# Patient Record
Sex: FEMALE | Race: WHITE | NOT HISPANIC OR LATINO
[De-identification: names, ages, dates, MRNs, and addresses within clinical notes are randomized per-mention and may not be internally consistent; named-entity substitution may affect disease eponyms.]

---

## 2017-01-31 ENCOUNTER — APPOINTMENT (OUTPATIENT)
Dept: CARDIOLOGY | Facility: CLINIC | Age: 82
End: 2017-01-31

## 2017-02-08 ENCOUNTER — APPOINTMENT (OUTPATIENT)
Dept: CARDIOLOGY | Facility: CLINIC | Age: 82
End: 2017-02-08

## 2017-05-08 ENCOUNTER — APPOINTMENT (OUTPATIENT)
Dept: INTERNAL MEDICINE | Facility: CLINIC | Age: 82
End: 2017-05-08

## 2017-05-08 VITALS
HEIGHT: 63 IN | BODY MASS INDEX: 25.69 KG/M2 | WEIGHT: 145 LBS | SYSTOLIC BLOOD PRESSURE: 100 MMHG | HEART RATE: 52 BPM | OXYGEN SATURATION: 95 % | TEMPERATURE: 98.2 F | DIASTOLIC BLOOD PRESSURE: 54 MMHG

## 2017-05-08 VITALS — DIASTOLIC BLOOD PRESSURE: 54 MMHG | SYSTOLIC BLOOD PRESSURE: 132 MMHG

## 2017-05-08 DIAGNOSIS — R42 DIZZINESS AND GIDDINESS: ICD-10-CM

## 2017-05-23 ENCOUNTER — APPOINTMENT (OUTPATIENT)
Dept: VASCULAR SURGERY | Facility: CLINIC | Age: 82
End: 2017-05-23

## 2017-05-23 VITALS
WEIGHT: 143 LBS | HEIGHT: 63 IN | DIASTOLIC BLOOD PRESSURE: 79 MMHG | BODY MASS INDEX: 25.34 KG/M2 | SYSTOLIC BLOOD PRESSURE: 147 MMHG | TEMPERATURE: 98.1 F | HEART RATE: 61 BPM

## 2017-05-23 DIAGNOSIS — Z82.49 FAMILY HISTORY OF ISCHEMIC HEART DISEASE AND OTHER DISEASES OF THE CIRCULATORY SYSTEM: ICD-10-CM

## 2017-05-23 DIAGNOSIS — Z96.649 PRESENCE OF UNSPECIFIED ARTIFICIAL HIP JOINT: ICD-10-CM

## 2017-05-23 DIAGNOSIS — Z87.39 PERSONAL HISTORY OF OTHER DISEASES OF THE MUSCULOSKELETAL SYSTEM AND CONNECTIVE TISSUE: ICD-10-CM

## 2017-06-02 ENCOUNTER — APPOINTMENT (OUTPATIENT)
Dept: VASCULAR SURGERY | Facility: CLINIC | Age: 82
End: 2017-06-02

## 2017-06-05 ENCOUNTER — APPOINTMENT (OUTPATIENT)
Dept: VASCULAR SURGERY | Facility: CLINIC | Age: 82
End: 2017-06-05

## 2017-06-08 ENCOUNTER — APPOINTMENT (OUTPATIENT)
Dept: INTERNAL MEDICINE | Facility: CLINIC | Age: 82
End: 2017-06-08

## 2017-07-19 ENCOUNTER — NON-APPOINTMENT (OUTPATIENT)
Age: 82
End: 2017-07-19

## 2017-07-19 ENCOUNTER — APPOINTMENT (OUTPATIENT)
Dept: INTERNAL MEDICINE | Facility: CLINIC | Age: 82
End: 2017-07-19

## 2017-07-19 ENCOUNTER — LABORATORY RESULT (OUTPATIENT)
Age: 82
End: 2017-07-19

## 2017-07-19 VITALS
DIASTOLIC BLOOD PRESSURE: 60 MMHG | BODY MASS INDEX: 26.13 KG/M2 | WEIGHT: 142 LBS | OXYGEN SATURATION: 100 % | TEMPERATURE: 98 F | HEIGHT: 62 IN | HEART RATE: 58 BPM | SYSTOLIC BLOOD PRESSURE: 110 MMHG

## 2017-08-04 ENCOUNTER — APPOINTMENT (OUTPATIENT)
Dept: VASCULAR SURGERY | Facility: CLINIC | Age: 82
End: 2017-08-04

## 2017-08-07 ENCOUNTER — APPOINTMENT (OUTPATIENT)
Dept: VASCULAR SURGERY | Facility: CLINIC | Age: 82
End: 2017-08-07

## 2017-08-28 LAB
ALBUMIN SERPL ELPH-MCNC: 4.2 G/DL
ALP BLD-CCNC: 77 U/L
ALT SERPL-CCNC: 12 U/L
ANION GAP SERPL CALC-SCNC: 16 MMOL/L
APPEARANCE: CLEAR
AST SERPL-CCNC: 22 U/L
BASOPHILS # BLD AUTO: 0.07 K/UL
BASOPHILS NFR BLD AUTO: 1.7 %
BILIRUB SERPL-MCNC: 0.8 MG/DL
BILIRUBIN URINE: NEGATIVE
BLOOD URINE: ABNORMAL
BUN SERPL-MCNC: 20 MG/DL
CALCIUM SERPL-MCNC: 9.3 MG/DL
CHLORIDE SERPL-SCNC: 107 MMOL/L
CHOLEST SERPL-MCNC: 174 MG/DL
CHOLEST/HDLC SERPL: 2.1 RATIO
CO2 SERPL-SCNC: 21 MMOL/L
COLOR: YELLOW
CREAT SERPL-MCNC: 0.82 MG/DL
EOSINOPHIL # BLD AUTO: 0.07 K/UL
EOSINOPHIL NFR BLD AUTO: 1.7 %
FERRITIN SERPL-MCNC: 162 NG/ML
GLUCOSE QUALITATIVE U: NORMAL MG/DL
GLUCOSE SERPL-MCNC: 94 MG/DL
HBA1C MFR BLD HPLC: 5.3 %
HCT VFR BLD CALC: 30.3 %
HDLC SERPL-MCNC: 82 MG/DL
HGB BLD-MCNC: 9.9 G/DL
IMM GRANULOCYTES NFR BLD AUTO: 0.2 %
IRON SATN MFR SERPL: 55 %
IRON SERPL-MCNC: 187 UG/DL
KETONES URINE: NEGATIVE
LDLC SERPL CALC-MCNC: 79 MG/DL
LEUKOCYTE ESTERASE URINE: ABNORMAL
LYMPHOCYTES # BLD AUTO: 2.11 K/UL
LYMPHOCYTES NFR BLD AUTO: 49.8 %
MAN DIFF?: NORMAL
MCHC RBC-ENTMCNC: 30.6 PG
MCHC RBC-ENTMCNC: 32.7 GM/DL
MCV RBC AUTO: 93.5 FL
MONOCYTES # BLD AUTO: 0.35 K/UL
MONOCYTES NFR BLD AUTO: 8.3 %
NEUTROPHILS # BLD AUTO: 1.63 K/UL
NEUTROPHILS NFR BLD AUTO: 38.3 %
NITRITE URINE: POSITIVE
PH URINE: 5.5
PLATELET # BLD AUTO: 246 K/UL
POTASSIUM SERPL-SCNC: 4.3 MMOL/L
PROT SERPL-MCNC: 6.9 G/DL
PROTEIN URINE: NEGATIVE MG/DL
RBC # BLD: 3.24 M/UL
RBC # FLD: 18.6 %
SODIUM SERPL-SCNC: 144 MMOL/L
SPECIFIC GRAVITY URINE: 1.03
T4 FREE SERPL-MCNC: 0.9 NG/DL
TIBC SERPL-MCNC: 342 UG/DL
TRIGL SERPL-MCNC: 67 MG/DL
TSH SERPL-ACNC: 2.11 UIU/ML
UIBC SERPL-MCNC: 155 UG/DL
UROBILINOGEN URINE: NORMAL MG/DL
WBC # FLD AUTO: 4.24 K/UL

## 2017-09-15 ENCOUNTER — APPOINTMENT (OUTPATIENT)
Dept: INTERNAL MEDICINE | Facility: CLINIC | Age: 82
End: 2017-09-15
Payer: COMMERCIAL

## 2017-09-15 VITALS
DIASTOLIC BLOOD PRESSURE: 72 MMHG | OXYGEN SATURATION: 97 % | HEIGHT: 62 IN | SYSTOLIC BLOOD PRESSURE: 110 MMHG | TEMPERATURE: 98.1 F | HEART RATE: 63 BPM
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
## 2017-09-15 PROCEDURE — 99214 OFFICE O/P EST MOD 30 MIN: CPT

## 2017-10-06 ENCOUNTER — OUTPATIENT (OUTPATIENT)
Dept: OUTPATIENT SERVICES | Facility: HOSPITAL | Age: 82
LOS: 1 days | End: 2017-10-06
Payer: MEDICARE

## 2017-10-06 ENCOUNTER — APPOINTMENT (OUTPATIENT)
Dept: MAMMOGRAPHY | Facility: IMAGING CENTER | Age: 82
End: 2017-10-06
Payer: MEDICARE

## 2017-10-06 DIAGNOSIS — Z90.710 ACQUIRED ABSENCE OF BOTH CERVIX AND UTERUS: Chronic | ICD-10-CM

## 2017-10-06 DIAGNOSIS — Z98.89 OTHER SPECIFIED POSTPROCEDURAL STATES: Chronic | ICD-10-CM

## 2017-10-06 DIAGNOSIS — Z96.649 PRESENCE OF UNSPECIFIED ARTIFICIAL HIP JOINT: Chronic | ICD-10-CM

## 2017-10-06 DIAGNOSIS — Z00.8 ENCOUNTER FOR OTHER GENERAL EXAMINATION: ICD-10-CM

## 2017-10-06 PROCEDURE — 77063 BREAST TOMOSYNTHESIS BI: CPT

## 2017-10-06 PROCEDURE — 77067 SCR MAMMO BI INCL CAD: CPT

## 2017-10-06 PROCEDURE — G0202: CPT | Mod: 26

## 2017-10-06 PROCEDURE — 77063 BREAST TOMOSYNTHESIS BI: CPT | Mod: 26

## 2017-11-06 ENCOUNTER — MEDICATION RENEWAL (OUTPATIENT)
Age: 82
End: 2017-11-06

## 2018-04-23 ENCOUNTER — APPOINTMENT (OUTPATIENT)
Dept: INTERNAL MEDICINE | Facility: CLINIC | Age: 83
End: 2018-04-23
Payer: MEDICARE

## 2018-04-23 VITALS
DIASTOLIC BLOOD PRESSURE: 70 MMHG | OXYGEN SATURATION: 98 % | WEIGHT: 147 LBS | BODY MASS INDEX: 27.05 KG/M2 | HEIGHT: 62 IN | HEART RATE: 59 BPM | SYSTOLIC BLOOD PRESSURE: 140 MMHG

## 2018-04-23 DIAGNOSIS — N39.0 URINARY TRACT INFECTION, SITE NOT SPECIFIED: ICD-10-CM

## 2018-04-23 DIAGNOSIS — Z87.09 PERSONAL HISTORY OF OTHER DISEASES OF THE RESPIRATORY SYSTEM: ICD-10-CM

## 2018-04-23 DIAGNOSIS — Z87.820 PERSONAL HISTORY OF TRAUMATIC BRAIN INJURY: ICD-10-CM

## 2018-04-23 DIAGNOSIS — B37.3 CANDIDIASIS OF VULVA AND VAGINA: ICD-10-CM

## 2018-04-23 PROCEDURE — 99213 OFFICE O/P EST LOW 20 MIN: CPT

## 2018-04-26 ENCOUNTER — APPOINTMENT (OUTPATIENT)
Dept: CARDIOLOGY | Facility: CLINIC | Age: 83
End: 2018-04-26
Payer: MEDICARE

## 2018-04-26 ENCOUNTER — NON-APPOINTMENT (OUTPATIENT)
Age: 83
End: 2018-04-26

## 2018-04-26 VITALS
BODY MASS INDEX: 27.23 KG/M2 | OXYGEN SATURATION: 94 % | TEMPERATURE: 99.2 F | WEIGHT: 148 LBS | SYSTOLIC BLOOD PRESSURE: 153 MMHG | HEART RATE: 70 BPM | HEIGHT: 62 IN | DIASTOLIC BLOOD PRESSURE: 65 MMHG

## 2018-04-26 VITALS — DIASTOLIC BLOOD PRESSURE: 70 MMHG | SYSTOLIC BLOOD PRESSURE: 130 MMHG

## 2018-04-26 PROCEDURE — 99215 OFFICE O/P EST HI 40 MIN: CPT

## 2018-04-26 PROCEDURE — 93000 ELECTROCARDIOGRAM COMPLETE: CPT

## 2018-05-07 ENCOUNTER — APPOINTMENT (OUTPATIENT)
Dept: CARDIOLOGY | Facility: CLINIC | Age: 83
End: 2018-05-07

## 2018-05-29 ENCOUNTER — APPOINTMENT (OUTPATIENT)
Dept: CARDIOLOGY | Facility: CLINIC | Age: 83
End: 2018-05-29
Payer: MEDICARE

## 2018-05-29 PROCEDURE — 78452 HT MUSCLE IMAGE SPECT MULT: CPT

## 2018-05-29 PROCEDURE — A9500: CPT

## 2018-05-29 PROCEDURE — 93015 CV STRESS TEST SUPVJ I&R: CPT

## 2018-07-16 ENCOUNTER — LABORATORY RESULT (OUTPATIENT)
Age: 83
End: 2018-07-16

## 2018-07-16 ENCOUNTER — APPOINTMENT (OUTPATIENT)
Dept: INTERNAL MEDICINE | Facility: CLINIC | Age: 83
End: 2018-07-16
Payer: MEDICARE

## 2018-07-16 VITALS
DIASTOLIC BLOOD PRESSURE: 60 MMHG | HEIGHT: 62 IN | BODY MASS INDEX: 26.31 KG/M2 | SYSTOLIC BLOOD PRESSURE: 118 MMHG | HEART RATE: 60 BPM | TEMPERATURE: 98.3 F | WEIGHT: 143 LBS | OXYGEN SATURATION: 96 %

## 2018-07-16 DIAGNOSIS — N39.41 URGE INCONTINENCE: ICD-10-CM

## 2018-07-16 DIAGNOSIS — I83.90 ASYMPTOMATIC VARICOSE VEINS OF UNSPECIFIED LOWER EXTREMITY: ICD-10-CM

## 2018-07-16 PROCEDURE — G0439: CPT

## 2018-07-16 PROCEDURE — G0444 DEPRESSION SCREEN ANNUAL: CPT | Mod: 59

## 2018-07-16 PROCEDURE — 36415 COLL VENOUS BLD VENIPUNCTURE: CPT

## 2018-07-16 RX ORDER — MIRABEGRON 25 MG/1
25 TABLET, FILM COATED, EXTENDED RELEASE ORAL
Qty: 30 | Refills: 0 | Status: DISCONTINUED | COMMUNITY
Start: 2017-12-19 | End: 2018-07-16

## 2018-07-16 RX ORDER — NAPROXEN 500 MG/1
500 TABLET, DELAYED RELEASE ORAL TWICE DAILY
Qty: 20 | Refills: 1 | Status: DISCONTINUED | COMMUNITY
Start: 2018-04-23 | End: 2018-07-16

## 2018-07-17 NOTE — PHYSICAL EXAM
[No Acute Distress] : no acute distress [Well Nourished] : well nourished [Well Developed] : well developed [Well-Appearing] : well-appearing [Normal Voice/Communication] : normal voice/communication [Normal Sclera/Conjunctiva] : normal sclera/conjunctiva [PERRL] : pupils equal round and reactive to light [EOMI] : extraocular movements intact [Normal Outer Ear/Nose] : the outer ears and nose were normal in appearance [Normal Oropharynx] : the oropharynx was normal [Normal TMs] : both tympanic membranes were normal [No JVD] : no jugular venous distention [Supple] : supple [No Lymphadenopathy] : no lymphadenopathy [Thyroid Normal, No Nodules] : the thyroid was normal and there were no nodules present [No Respiratory Distress] : no respiratory distress  [Clear to Auscultation] : lungs were clear to auscultation bilaterally [No Accessory Muscle Use] : no accessory muscle use [Normal Rate] : normal rate  [Regular Rhythm] : with a regular rhythm [Normal S1, S2] : normal S1 and S2 [No Murmur] : no murmur heard [No Carotid Bruits] : no carotid bruits [No Abdominal Bruit] : a ~M bruit was not heard ~T in the abdomen [No Varicosities] : no varicosities [Pedal Pulses Present] : the pedal pulses are present [No Edema] : there was no peripheral edema [No Extremity Clubbing/Cyanosis] : no extremity clubbing/cyanosis [Declined Breast Exam] : declined breast exam  [Soft] : abdomen soft [Non Tender] : non-tender [Non-distended] : non-distended [No Masses] : no abdominal mass palpated [No HSM] : no HSM [Normal Bowel Sounds] : normal bowel sounds [Normal Supraclavicular Nodes] : no supraclavicular lymphadenopathy [Normal Posterior Cervical Nodes] : no posterior cervical lymphadenopathy [Normal Anterior Cervical Nodes] : no anterior cervical lymphadenopathy [No Spinal Tenderness] : no spinal tenderness [No Joint Swelling] : no joint swelling [Grossly Normal Strength/Tone] : grossly normal strength/tone [No Rash] : no rash [Normal Gait] : normal gait [Coordination Grossly Intact] : coordination grossly intact [No Focal Deficits] : no focal deficits [Speech Grossly Normal] : speech grossly normal [Normal Affect] : the affect was normal [Normal Mood] : the mood was normal [Normal Insight/Judgement] : insight and judgment were intact

## 2018-07-17 NOTE — ASSESSMENT
[FreeTextEntry1] : discussed w pt \par \par check routine labs as below \par \par cont current rx \par \par cont current activities mild exercise \par \par discussed vaccinations, she would like to consider new Shingrix vaccine when supply available \par \par cont urology f/u w Dr Zaragoza, excellent results after Botox inj \par \par reviewed cardio workup for prior atypical chest pains w negative stress testing and w persistent mild exertional dyspnea when ascending stairs. will advised pulmonary consult and PFT evaluation, gave info for referral and she will make appt \par \par RTO few months for f/u or earlier prn if any new concerns \par

## 2018-07-17 NOTE — REVIEW OF SYSTEMS
[Chest Pain] : no chest pain [Leg Claudication] : no leg claudication [Lower Ext Edema] : no lower extremity edema [Orthopnea] : no orthopnea [Paroysmal Nocturnal Dyspnea] : no paroysmal nocturnal dyspnea [Wheezing] : no wheezing [Cough] : no cough [Dyspnea on Exertion] : dyspnea on exertion [Dysuria] : no dysuria [Hematuria] : no hematuria [Negative] : Heme/Lymph

## 2018-07-17 NOTE — HISTORY OF PRESENT ILLNESS
[de-identified] : presents for annual physical exam. she feels well overall currently. but she has noted persistent mild dyspnea on exertion, mainly walking up stairs over pst few months. she had further eval w Dr Hoffman cardiology , had neg stress echo and neg nuclear stress testing over past 1-2 years. MVP has remained stable. she is a nonsmoker and has no chronic cough. \par \par labile HTN well controlled on rx \par chronic mild anemia , unclear etiology asymptomatic , FOBT neg and she had colonoscopy completed w Dr Wilkins this past year w benign polyp \par urinary urge incontinence much improved after Botox treatment w Dr Zaragoza urology - pt is very pleased w excellent results

## 2018-07-17 NOTE — HEALTH RISK ASSESSMENT
[] : No [No falls in past year] : Patient reported no falls in the past year [Patient reported mammogram was normal] : Patient reported mammogram was normal [Patient declined bone density test] : Patient declined bone density test [Patient reported colonoscopy was normal] : Patient reported colonoscopy was normal [None] : None [MammogramDate] : 10/17 [ColonoscopyDate] : 03/18

## 2018-07-26 ENCOUNTER — APPOINTMENT (OUTPATIENT)
Dept: PULMONOLOGY | Facility: CLINIC | Age: 83
End: 2018-07-26
Payer: MEDICARE

## 2018-07-26 VITALS
WEIGHT: 143 LBS | HEART RATE: 65 BPM | SYSTOLIC BLOOD PRESSURE: 128 MMHG | RESPIRATION RATE: 17 BRPM | OXYGEN SATURATION: 97 % | BODY MASS INDEX: 26.31 KG/M2 | DIASTOLIC BLOOD PRESSURE: 62 MMHG | HEIGHT: 62 IN

## 2018-07-26 DIAGNOSIS — R06.02 SHORTNESS OF BREATH: ICD-10-CM

## 2018-07-26 PROCEDURE — 94727 GAS DIL/WSHOT DETER LNG VOL: CPT

## 2018-07-26 PROCEDURE — 94729 DIFFUSING CAPACITY: CPT

## 2018-07-26 PROCEDURE — 99204 OFFICE O/P NEW MOD 45 MIN: CPT | Mod: 25

## 2018-07-26 PROCEDURE — 94060 EVALUATION OF WHEEZING: CPT

## 2018-08-06 LAB
ALBUMIN SERPL ELPH-MCNC: 4.5 G/DL
ALP BLD-CCNC: 80 U/L
ALT SERPL-CCNC: 18 U/L
ANION GAP SERPL CALC-SCNC: 18 MMOL/L
APPEARANCE: CLEAR
AST SERPL-CCNC: 22 U/L
BASOPHILS # BLD AUTO: 0.07 K/UL
BASOPHILS NFR BLD AUTO: 1.6 %
BILIRUB SERPL-MCNC: 0.7 MG/DL
BILIRUBIN URINE: NEGATIVE
BLOOD URINE: NEGATIVE
BUN SERPL-MCNC: 14 MG/DL
CALCIUM SERPL-MCNC: 8.8 MG/DL
CHLORIDE SERPL-SCNC: 106 MMOL/L
CHOLEST SERPL-MCNC: 159 MG/DL
CHOLEST/HDLC SERPL: 2.1 RATIO
CO2 SERPL-SCNC: 19 MMOL/L
COLOR: YELLOW
CREAT SERPL-MCNC: 0.63 MG/DL
EOSINOPHIL # BLD AUTO: 0.06 K/UL
EOSINOPHIL NFR BLD AUTO: 1.4 %
GLUCOSE QUALITATIVE U: NEGATIVE MG/DL
GLUCOSE SERPL-MCNC: 95 MG/DL
HBA1C MFR BLD HPLC: 5.7 %
HCT VFR BLD CALC: 30.2 %
HDLC SERPL-MCNC: 75 MG/DL
HGB BLD-MCNC: 10.2 G/DL
IMM GRANULOCYTES NFR BLD AUTO: 0 %
KETONES URINE: NEGATIVE
LDLC SERPL CALC-MCNC: 66 MG/DL
LEUKOCYTE ESTERASE URINE: ABNORMAL
LYMPHOCYTES # BLD AUTO: 2.2 K/UL
LYMPHOCYTES NFR BLD AUTO: 51.2 %
MAN DIFF?: NORMAL
MCHC RBC-ENTMCNC: 31.8 PG
MCHC RBC-ENTMCNC: 33.8 GM/DL
MCV RBC AUTO: 94.1 FL
MONOCYTES # BLD AUTO: 0.22 K/UL
MONOCYTES NFR BLD AUTO: 5.1 %
NEUTROPHILS # BLD AUTO: 1.75 K/UL
NEUTROPHILS NFR BLD AUTO: 40.7 %
NITRITE URINE: NEGATIVE
PH URINE: 5.5
PLATELET # BLD AUTO: 212 K/UL
POTASSIUM SERPL-SCNC: 4.1 MMOL/L
PROT SERPL-MCNC: 6.3 G/DL
PROTEIN URINE: ABNORMAL MG/DL
RBC # BLD: 3.21 M/UL
RBC # FLD: 19.4 %
SODIUM SERPL-SCNC: 143 MMOL/L
SPECIFIC GRAVITY URINE: 1.02
T4 FREE SERPL-MCNC: 0.9 NG/DL
TRIGL SERPL-MCNC: 92 MG/DL
TSH SERPL-ACNC: 2.11 UIU/ML
UROBILINOGEN URINE: NEGATIVE MG/DL
WBC # FLD AUTO: 4.3 K/UL

## 2018-08-09 ENCOUNTER — OUTPATIENT (OUTPATIENT)
Dept: OUTPATIENT SERVICES | Facility: HOSPITAL | Age: 83
LOS: 1 days | End: 2018-08-09
Payer: MEDICARE

## 2018-08-09 ENCOUNTER — APPOINTMENT (OUTPATIENT)
Dept: ULTRASOUND IMAGING | Facility: IMAGING CENTER | Age: 83
End: 2018-08-09
Payer: MEDICARE

## 2018-08-09 DIAGNOSIS — Z90.710 ACQUIRED ABSENCE OF BOTH CERVIX AND UTERUS: Chronic | ICD-10-CM

## 2018-08-09 DIAGNOSIS — Z96.649 PRESENCE OF UNSPECIFIED ARTIFICIAL HIP JOINT: Chronic | ICD-10-CM

## 2018-08-09 DIAGNOSIS — Z00.8 ENCOUNTER FOR OTHER GENERAL EXAMINATION: ICD-10-CM

## 2018-08-09 DIAGNOSIS — Z98.89 OTHER SPECIFIED POSTPROCEDURAL STATES: Chronic | ICD-10-CM

## 2018-08-09 PROCEDURE — 76700 US EXAM ABDOM COMPLETE: CPT

## 2018-08-09 PROCEDURE — 76700 US EXAM ABDOM COMPLETE: CPT | Mod: 26

## 2018-10-10 ENCOUNTER — LABORATORY RESULT (OUTPATIENT)
Age: 83
End: 2018-10-10

## 2018-10-10 ENCOUNTER — APPOINTMENT (OUTPATIENT)
Dept: INTERNAL MEDICINE | Facility: CLINIC | Age: 83
End: 2018-10-10
Payer: MEDICARE

## 2018-10-10 VITALS
SYSTOLIC BLOOD PRESSURE: 110 MMHG | OXYGEN SATURATION: 98 % | TEMPERATURE: 97.8 F | WEIGHT: 145 LBS | DIASTOLIC BLOOD PRESSURE: 60 MMHG | HEART RATE: 60 BPM | HEIGHT: 62 IN | BODY MASS INDEX: 26.68 KG/M2

## 2018-10-10 DIAGNOSIS — Z23 ENCOUNTER FOR IMMUNIZATION: ICD-10-CM

## 2018-10-10 DIAGNOSIS — D64.9 ANEMIA, UNSPECIFIED: ICD-10-CM

## 2018-10-10 DIAGNOSIS — D72.9 DISORDER OF WHITE BLOOD CELLS, UNSPECIFIED: ICD-10-CM

## 2018-10-10 PROCEDURE — 90662 IIV NO PRSV INCREASED AG IM: CPT

## 2018-10-10 PROCEDURE — 99214 OFFICE O/P EST MOD 30 MIN: CPT | Mod: 25

## 2018-10-10 PROCEDURE — G0008: CPT

## 2018-10-10 PROCEDURE — 36415 COLL VENOUS BLD VENIPUNCTURE: CPT

## 2018-10-10 NOTE — PHYSICAL EXAM
[No Acute Distress] : no acute distress [Well-Appearing] : well-appearing [Normal Voice/Communication] : normal voice/communication [Normal Sclera/Conjunctiva] : normal sclera/conjunctiva [No JVD] : no jugular venous distention [No Respiratory Distress] : no respiratory distress  [Clear to Auscultation] : lungs were clear to auscultation bilaterally [No Accessory Muscle Use] : no accessory muscle use [Normal Rate] : normal rate  [Regular Rhythm] : with a regular rhythm [Normal S1, S2] : normal S1 and S2 [No Edema] : there was no peripheral edema [Normal Supraclavicular Nodes] : no supraclavicular lymphadenopathy [Normal Posterior Cervical Nodes] : no posterior cervical lymphadenopathy [Normal Anterior Cervical Nodes] : no anterior cervical lymphadenopathy [Grossly Normal Strength/Tone] : grossly normal strength/tone [Normal Gait] : normal gait [Normal Affect] : the affect was normal [Normal Mood] : the mood was normal [Normal Insight/Judgement] : insight and judgment were intact

## 2018-10-10 NOTE — HISTORY OF PRESENT ILLNESS
[de-identified] : presents for f/u visit to review recent persistent feelings of fatigue. she is taking some naps during the day which she did not do in the past. she has moderate anemia which is persistent recently Hgb10 range. she saw her GI Dr Wilkins and had EGD and colonoscopy done 2 months ago w no significant findings. no bleeding concerns. she has followed w hematologist Dr Pineda in the past and she plans to make appt for reevaluation.

## 2018-10-10 NOTE — ASSESSMENT
[FreeTextEntry1] : reviewed w pt \par most recent labs reviewed \par EGD , colonoscopy done recently \par moderate anemia persists. noted some lymphocytosis, normal WBC. unclear if she may have a lymphatic process such as low grade CLL which will need to be evaluated further. \par repeat labs today as below including flow cytometry \par she will be making appt w Dr Pineda to review these labs \par \par f/u in 2-3 weeks or call earlier prn

## 2018-10-16 LAB
ALBUMIN SERPL ELPH-MCNC: 4.5 G/DL
ALP BLD-CCNC: 85 U/L
ALT SERPL-CCNC: 16 U/L
ANION GAP SERPL CALC-SCNC: 14 MMOL/L
AST SERPL-CCNC: 27 U/L
BASOPHILS # BLD AUTO: 0.11 K/UL
BASOPHILS NFR BLD AUTO: 2.1 %
BILIRUB SERPL-MCNC: 0.5 MG/DL
BUN SERPL-MCNC: 13 MG/DL
CALCIUM SERPL-MCNC: 9.2 MG/DL
CHLORIDE SERPL-SCNC: 106 MMOL/L
CO2 SERPL-SCNC: 23 MMOL/L
CREAT SERPL-MCNC: 0.61 MG/DL
EOSINOPHIL # BLD AUTO: 0.05 K/UL
EOSINOPHIL NFR BLD AUTO: 1 %
FERRITIN SERPL-MCNC: 195 NG/ML
GLUCOSE SERPL-MCNC: 81 MG/DL
HCT VFR BLD CALC: 32.1 %
HGB BLD-MCNC: 9.8 G/DL
IMM GRANULOCYTES NFR BLD AUTO: 0.2 %
IRON SATN MFR SERPL: 27 %
IRON SERPL-MCNC: 117 UG/DL
LYMPHOCYTES # BLD AUTO: 2.63 K/UL
LYMPHOCYTES NFR BLD AUTO: 50 %
MAN DIFF?: NORMAL
MCHC RBC-ENTMCNC: 30.2 PG
MCHC RBC-ENTMCNC: 30.5 GM/DL
MCV RBC AUTO: 99.1 FL
MONOCYTES # BLD AUTO: 0.28 K/UL
MONOCYTES NFR BLD AUTO: 5.3 %
NEUTROPHILS # BLD AUTO: 2.18 K/UL
NEUTROPHILS NFR BLD AUTO: 41.4 %
PLATELET # BLD AUTO: 236 K/UL
POTASSIUM SERPL-SCNC: 4.3 MMOL/L
PROT SERPL-MCNC: 7 G/DL
RBC # BLD: 3.24 M/UL
RBC # FLD: 20.1 %
SODIUM SERPL-SCNC: 143 MMOL/L
TIBC SERPL-MCNC: 429 UG/DL
UIBC SERPL-MCNC: 312 UG/DL
WBC # FLD AUTO: 5.26 K/UL

## 2019-01-22 ENCOUNTER — MEDICATION RENEWAL (OUTPATIENT)
Age: 84
End: 2019-01-22

## 2019-01-24 ENCOUNTER — RX RENEWAL (OUTPATIENT)
Age: 84
End: 2019-01-24

## 2019-02-14 ENCOUNTER — APPOINTMENT (OUTPATIENT)
Dept: INTERNAL MEDICINE | Facility: CLINIC | Age: 84
End: 2019-02-14
Payer: MEDICARE

## 2019-02-14 ENCOUNTER — FORM ENCOUNTER (OUTPATIENT)
Age: 84
End: 2019-02-14

## 2019-02-14 VITALS
HEART RATE: 67 BPM | WEIGHT: 140 LBS | TEMPERATURE: 98 F | HEIGHT: 62 IN | DIASTOLIC BLOOD PRESSURE: 60 MMHG | OXYGEN SATURATION: 97 % | BODY MASS INDEX: 25.76 KG/M2 | SYSTOLIC BLOOD PRESSURE: 110 MMHG

## 2019-02-14 DIAGNOSIS — M25.619 STIFFNESS OF UNSPECIFIED SHOULDER, NOT ELSEWHERE CLASSIFIED: ICD-10-CM

## 2019-02-14 DIAGNOSIS — M25.512 PAIN IN LEFT SHOULDER: ICD-10-CM

## 2019-02-14 DIAGNOSIS — D46.1 REFRACTORY ANEMIA WITH RING SIDEROBLASTS: ICD-10-CM

## 2019-02-14 PROCEDURE — 99214 OFFICE O/P EST MOD 30 MIN: CPT

## 2019-02-14 RX ORDER — LEVOFLOXACIN 500 MG/1
500 TABLET, FILM COATED ORAL
Qty: 10 | Refills: 0 | Status: DISCONTINUED | COMMUNITY
Start: 2018-12-07

## 2019-02-14 RX ORDER — DARBEPOETIN ALFA 100 UG/.5ML
100 INJECTION, SOLUTION INTRAVENOUS; SUBCUTANEOUS
Refills: 0 | Status: ACTIVE | COMMUNITY

## 2019-02-14 RX ORDER — CEPHALEXIN 500 MG/1
500 CAPSULE ORAL
Qty: 21 | Refills: 0 | Status: DISCONTINUED | COMMUNITY
Start: 2018-08-30

## 2019-02-14 NOTE — ASSESSMENT
[FreeTextEntry1] : discussed w pt \par her history strongly suggests that she developed significant tendinopathy of shoulder rotator cuff muscles and may have a rotator cuff tear, due to the use of a fluoroquinolone 2 months ago. discussed this in detail w pt.\par she will need MRI imaging to asses her rotator cuff , ordered. \par advised orthopedic surgery consult for further recommendations. \par she declines rx for pain, prefers only tylenol prn. \par \par reviewed her hematology eval w Dr Pineda, s/p bone marrow bx confirming RARS/myelodysplasia. she is on Aranesp and she will cont as scheduled \par \par RTO 2-3 weeks for f/u or earlier prn if any new concerns. will review MRI imaging w her when available. she should clearly avoid any fluoroquinolones in the future

## 2019-02-14 NOTE — PHYSICAL EXAM
[No Acute Distress] : no acute distress [Well-Appearing] : well-appearing [Normal Voice/Communication] : normal voice/communication [Normal Sclera/Conjunctiva] : normal sclera/conjunctiva [No JVD] : no jugular venous distention [No Respiratory Distress] : no respiratory distress  [Clear to Auscultation] : lungs were clear to auscultation bilaterally [No Accessory Muscle Use] : no accessory muscle use [Normal Rate] : normal rate  [Regular Rhythm] : with a regular rhythm [Normal S1, S2] : normal S1 and S2 [No Edema] : there was no peripheral edema [Normal Supraclavicular Nodes] : no supraclavicular lymphadenopathy [Normal Posterior Cervical Nodes] : no posterior cervical lymphadenopathy [Normal Anterior Cervical Nodes] : no anterior cervical lymphadenopathy [Normal Gait] : normal gait [Coordination Grossly Intact] : coordination grossly intact [Normal Affect] : the affect was normal [Normal Mood] : the mood was normal [Normal Insight/Judgement] : insight and judgment were intact [de-identified] : L shoulder mild tenderness - inability to abduct L shoulder beyond 90 deg , normal . pain w supination/pronation of L UE

## 2019-02-14 NOTE — REVIEW OF SYSTEMS
[Joint Pain] : joint pain [Muscle Weakness] : muscle weakness [Muscle Pain] : muscle pain [Negative] : Heme/Lymph [Abdominal Pain] : no abdominal pain [Back Pain] : no back pain [FreeTextEntry9] : L UE

## 2019-02-15 ENCOUNTER — APPOINTMENT (OUTPATIENT)
Age: 84
End: 2019-02-15
Payer: MEDICARE

## 2019-02-15 ENCOUNTER — OUTPATIENT (OUTPATIENT)
Dept: OUTPATIENT SERVICES | Facility: HOSPITAL | Age: 84
LOS: 1 days | End: 2019-02-15
Payer: MEDICARE

## 2019-02-15 DIAGNOSIS — M25.619 STIFFNESS OF UNSPECIFIED SHOULDER, NOT ELSEWHERE CLASSIFIED: ICD-10-CM

## 2019-02-15 DIAGNOSIS — Z96.649 PRESENCE OF UNSPECIFIED ARTIFICIAL HIP JOINT: Chronic | ICD-10-CM

## 2019-02-15 DIAGNOSIS — Z90.710 ACQUIRED ABSENCE OF BOTH CERVIX AND UTERUS: Chronic | ICD-10-CM

## 2019-02-15 DIAGNOSIS — Z98.89 OTHER SPECIFIED POSTPROCEDURAL STATES: Chronic | ICD-10-CM

## 2019-02-15 PROCEDURE — 73221 MRI JOINT UPR EXTREM W/O DYE: CPT

## 2019-02-15 PROCEDURE — 73221 MRI JOINT UPR EXTREM W/O DYE: CPT | Mod: 26,LT

## 2019-02-27 ENCOUNTER — APPOINTMENT (OUTPATIENT)
Dept: ORTHOPEDIC SURGERY | Facility: CLINIC | Age: 84
End: 2019-02-27
Payer: MEDICARE

## 2019-02-27 VITALS
WEIGHT: 140 LBS | SYSTOLIC BLOOD PRESSURE: 176 MMHG | HEART RATE: 67 BPM | HEIGHT: 62 IN | DIASTOLIC BLOOD PRESSURE: 73 MMHG | BODY MASS INDEX: 25.76 KG/M2

## 2019-02-27 DIAGNOSIS — M25.512 PAIN IN LEFT SHOULDER: ICD-10-CM

## 2019-02-27 PROCEDURE — 99203 OFFICE O/P NEW LOW 30 MIN: CPT

## 2019-02-27 PROCEDURE — 73030 X-RAY EXAM OF SHOULDER: CPT | Mod: LT

## 2019-02-28 PROBLEM — M25.512 ACUTE PAIN OF LEFT SHOULDER: Status: ACTIVE | Noted: 2019-02-28

## 2019-02-28 NOTE — CONSULT LETTER
[Dear  ___] : Dear  [unfilled], [Consult Letter:] : I had the pleasure of evaluating your patient, [unfilled]. [Please see my note below.] : Please see my note below. [Consult Closing:] : Thank you very much for allowing me to participate in the care of this patient.  If you have any questions, please do not hesitate to contact me. [Sincerely,] : Sincerely, [FreeTextEntry3] : Sha Persaud MD\par ______________________________________________\par [facility]: Sports Medicine\par [address], [address], [place] [address]\par (t) [phone]\par (f) [phone]                                                                  \par

## 2019-02-28 NOTE — DISCUSSION/SUMMARY
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 Recent exacerbation left shoulder pain likely from muscular fatigue, and lack of significant compensation mechanisms within the shoulder girdle. No evidence of acute internal derangement requires any further orthopedic treatment. 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 Discussed with patient the prognosis is good. Would recommend physical therapy for strengthening and conditioning.\par \par Recommendation: Conservative care & observation, this includes rest/activity avoidance until less symptomatic with subsequent gradual return to full activity as tolerated. Patient may also use OTC NSAID's or acetaminophen as tolerated, with application of ice to the area 2-3x daily for 20 minutes after periods of activity. \par \par PT prescription provided\par \par Followup 2-3 months as needed for possible injection therapy if symptoms persist

## 2019-02-28 NOTE — PHYSICAL EXAM
[de-identified] : Oriented to time, place, person\par Mood: Normal\par Affect: Normal\par Appearance: Healthy, well appearing, no acute distress.\par Gait: Normal\par Assistive Devices: None\par \par Left shoulder exam\par \par Inspection: No malalignment, No defects, No atrophy\par Skin: No masses, No lesions\par Neck: Negative Spurlings, full ROM, no pain with ROM\par AROM: FF to 160, abduction to 70, ER to 40, IR to the lumbar\par PROM: FF to 170, abduction to 85, ER to 50\par Painful arc ROM: Pain with active FF above shoulder\par Tenderness: + bicipital tenderness, + tenderness to the greater tuberosity/RTC insertion, + anterior shoulder/lesser tuberosity tenderness\par Strength: 4+/5 ER, 5/5 IR in adduction, 4/5 supraspinatus testing, + O'Briens test\par AC Joint: No ttp/pain with cross arm testing\par Biceps: Speed Negative, Yergusons Negative\par Impingement test: Positive Mobley, Positive Neer \par Stability: Stable\par Vasc: 2+ radial pulse\par Neuro: AIN, PIN, Ulnar nerve in tact to motor\par Sensation: In tact to light touch throughout\par \par  [de-identified] : \par The following radiographs were ordered and read by me during this patients visit. I reviewed each radiograph in detail with the patient and discussed the findings as highlighted below. \par \par 3 views of the left shoulder were obtained today that show no acute fracture or dislocation. There is no glenohumeral and min AC joint degenerative change seen. Type II acromion. There is no significant malalignment. No significant other obvious osseous abnormality, otherwise unremarkable.\par \par MRI left shoulder dated 2.15.19 shows evidence of tendinopathy of the rotator cuff with small degenerative intrasubstance tear infraspinatus.

## 2019-02-28 NOTE — HISTORY OF PRESENT ILLNESS
[de-identified] : CONSULTATION REPORT\par Referred by Arturo Salmon for evaluation of left shoulder pain\par \par 83 year old female presents today with left shoulder pain x 2 months. No injury reported, however reports that the pain started two days after taking Levofloxacin. The pain is constant worse with overhead movements and internal rotation. Reports some limited ROM. Her PMD obtained MRI and was dx with RTC tear. She is taking Tylenol/Aleve for pain which gives her mild relief. Reports occasional tingling in the arm. \par \par The patient's past medical history, past surgical history, medications and allergies were reviewed by me today with the patient and documented accordingly. In addition, the patient's family and social history, which were noncontributory to this visit, were reviewed also.

## 2019-04-23 ENCOUNTER — APPOINTMENT (OUTPATIENT)
Dept: CARDIOLOGY | Facility: CLINIC | Age: 84
End: 2019-04-23
Payer: MEDICARE

## 2019-04-23 ENCOUNTER — NON-APPOINTMENT (OUTPATIENT)
Age: 84
End: 2019-04-23

## 2019-04-23 VITALS
BODY MASS INDEX: 26.7 KG/M2 | DIASTOLIC BLOOD PRESSURE: 60 MMHG | OXYGEN SATURATION: 97 % | SYSTOLIC BLOOD PRESSURE: 160 MMHG | HEART RATE: 60 BPM | WEIGHT: 146 LBS

## 2019-04-23 DIAGNOSIS — R07.89 OTHER CHEST PAIN: ICD-10-CM

## 2019-04-23 DIAGNOSIS — D64.9 ANEMIA, UNSPECIFIED: ICD-10-CM

## 2019-04-23 DIAGNOSIS — I49.9 CARDIAC ARRHYTHMIA, UNSPECIFIED: ICD-10-CM

## 2019-04-23 PROCEDURE — 99214 OFFICE O/P EST MOD 30 MIN: CPT

## 2019-04-23 PROCEDURE — 93000 ELECTROCARDIOGRAM COMPLETE: CPT

## 2019-04-23 NOTE — HISTORY OF PRESENT ILLNESS
[FreeTextEntry1] : 84-year-old female with a history SMITH, hypertension, and a chest pain syndrome with negative cardiac workups.  She has multiple generalized complaints including headaches and weakness, but no current chest pain. She is here after her hematologist noted an irregular heart rate and suggested she be followed up here.

## 2019-04-23 NOTE — REVIEW OF SYSTEMS
[Feeling Fatigued] : feeling fatigued [Chest Pain] : chest pain [Dyspnea on exertion] : dyspnea during exertion [Joint Pain] : joint pain [Joint Stiffness] : joint stiffness [Negative] : Endocrine [Palpitations] : no palpitations

## 2019-04-23 NOTE — PHYSICAL EXAM
[General Appearance - Well Developed] : well developed [Normal Appearance] : normal appearance [Well Groomed] : well groomed [General Appearance - Well Nourished] : well nourished [Normal Conjunctiva] : the conjunctiva exhibited no abnormalities [No Deformities] : no deformities [General Appearance - In No Acute Distress] : no acute distress [Eyelids - No Xanthelasma] : the eyelids demonstrated no xanthelasmas [Normal Oral Mucosa] : normal oral mucosa [No Oral Pallor] : no oral pallor [No Oral Cyanosis] : no oral cyanosis [Normal Jugular Venous A Waves Present] : normal jugular venous A waves present [No Jugular Venous Palacios A Waves] : no jugular venous palacios A waves [Normal Jugular Venous V Waves Present] : normal jugular venous V waves present [Respiration, Rhythm And Depth] : normal respiratory rhythm and effort [Exaggerated Use Of Accessory Muscles For Inspiration] : no accessory muscle use [Auscultation Breath Sounds / Voice Sounds] : lungs were clear to auscultation bilaterally [Heart Rate And Rhythm] : heart rate and rhythm were normal [Heart Sounds] : normal S1 and S2 [Murmurs] : no murmurs present [Abdomen Soft] : soft [Abdomen Tenderness] : non-tender [Abdomen Mass (___ Cm)] : no abdominal mass palpated [Abnormal Walk] : normal gait [Gait - Sufficient For Exercise Testing] : the gait was sufficient for exercise testing [Nail Clubbing] : no clubbing of the fingernails [Petechial Hemorrhages (___cm)] : no petechial hemorrhages [Cyanosis, Localized] : no localized cyanosis [Skin Color & Pigmentation] : normal skin color and pigmentation [] : no rash [No Venous Stasis] : no venous stasis [Skin Lesions] : no skin lesions [No Skin Ulcers] : no skin ulcer [Oriented To Time, Place, And Person] : oriented to person, place, and time [No Xanthoma] : no  xanthoma was observed [Mood] : the mood was normal [Affect] : the affect was normal [No Anxiety] : not feeling anxious

## 2019-04-23 NOTE — DISCUSSION/SUMMARY
[FreeTextEntry1] : Ms Bocanegra has no current cardiac complaints. Her exam shows a normal blood pressure, I heard one premature beat during a minute of auscultation, clear lungs, and an otherwise normal cardiac exam. Her cardiogram is within normal limits.\par \par She is probably having some asymptomatic APCs or VPCs.  A Holter was placed to make sure no more significant arrhythmia was present.

## 2019-04-30 ENCOUNTER — NON-APPOINTMENT (OUTPATIENT)
Age: 84
End: 2019-04-30

## 2019-07-12 ENCOUNTER — APPOINTMENT (OUTPATIENT)
Dept: INTERNAL MEDICINE | Facility: CLINIC | Age: 84
End: 2019-07-12
Payer: MEDICARE

## 2019-07-12 ENCOUNTER — FORM ENCOUNTER (OUTPATIENT)
Age: 84
End: 2019-07-12

## 2019-07-12 VITALS
HEART RATE: 65 BPM | WEIGHT: 146 LBS | SYSTOLIC BLOOD PRESSURE: 144 MMHG | HEIGHT: 62 IN | TEMPERATURE: 98.3 F | BODY MASS INDEX: 26.87 KG/M2 | OXYGEN SATURATION: 98 % | DIASTOLIC BLOOD PRESSURE: 66 MMHG

## 2019-07-12 PROCEDURE — 99214 OFFICE O/P EST MOD 30 MIN: CPT

## 2019-07-12 NOTE — ASSESSMENT
[FreeTextEntry1] : discussed w pt \par \par vitals stable today\par discussed persistent episodes of loose stool over 1 month. no hx of antibiotic use or travel, no fever. she has some epigastric tenderness and RUQ tenderness on exam. she had EGD last year w her GI , showed some esophagitis, gastritis. \par advised to obtain abd US\par see her GI in next 1-2 weeks to reevaluate the epigastric pain and tenderness \par advised simple foods/diet\par check stool studies as below for loose stools\par \par has regular lab work w her hematology team, myelodysplasia being tx w Aranesp, inj given yesterday\par \par she has f/u appt already scheduled for next week here, call earlier prn if any worsening concerns

## 2019-07-12 NOTE — HISTORY OF PRESENT ILLNESS
[FreeTextEntry8] : presents for eval of persistent fatigue for about one month along with episodes of diarrhea/very loose stools after almost every meal. also has epigastric discomfort frequently. feels fatigued often, much less energy than usual. has labs done regularly w her hematologist. no weight loss or severe abd pain.  no recent travel, no recent antibiotic use or prior hx of C Diff. \par \par no vomiting or blood in stool. appears dark as she is on Fe supplement as well. \par myelodysplasia tx w Dr Pineda, on Aranesp inj when Hgb drop <10. she had Aranesp inj yesterday first time in few months.

## 2019-07-12 NOTE — REVIEW OF SYSTEMS
[Fatigue] : fatigue [Diarrhea] : diarrhea [Negative] : Heme/Lymph [Fever] : no fever [Recent Change In Weight] : ~T no recent weight change [Abdominal Pain] : no abdominal pain [Nausea] : no nausea [Vomiting] : no vomiting [Melena] : no melena

## 2019-07-12 NOTE — PHYSICAL EXAM
[No Acute Distress] : no acute distress [Normal Voice/Communication] : normal voice/communication [Normal Sclera/Conjunctiva] : normal sclera/conjunctiva [Normal Rate] : normal rate  [Normal S1, S2] : normal S1 and S2 [Regular Rhythm] : with a regular rhythm [Soft] : abdomen soft [No Edema] : there was no peripheral edema [No Masses] : no abdominal mass palpated [No HSM] : no HSM [Normal Supraclavicular Nodes] : no supraclavicular lymphadenopathy [Normal Posterior Cervical Nodes] : no posterior cervical lymphadenopathy [Normal Bowel Sounds] : normal bowel sounds [Coordination Grossly Intact] : coordination grossly intact [Normal Anterior Cervical Nodes] : no anterior cervical lymphadenopathy [Alert and Oriented x3] : oriented to person, place, and time [No Focal Deficits] : no focal deficits [Normal Mood] : the mood was normal [de-identified] : mildly fatigued  [de-identified] : + tenderness in epigastrium and RUQ region, no guarding or rebound

## 2019-07-13 ENCOUNTER — OUTPATIENT (OUTPATIENT)
Dept: OUTPATIENT SERVICES | Facility: HOSPITAL | Age: 84
LOS: 1 days | End: 2019-07-13
Payer: MEDICARE

## 2019-07-13 ENCOUNTER — APPOINTMENT (OUTPATIENT)
Dept: ULTRASOUND IMAGING | Facility: IMAGING CENTER | Age: 84
End: 2019-07-13
Payer: MEDICARE

## 2019-07-13 DIAGNOSIS — R10.816 EPIGASTRIC ABDOMINAL TENDERNESS: ICD-10-CM

## 2019-07-13 DIAGNOSIS — Z96.649 PRESENCE OF UNSPECIFIED ARTIFICIAL HIP JOINT: Chronic | ICD-10-CM

## 2019-07-13 DIAGNOSIS — Z98.89 OTHER SPECIFIED POSTPROCEDURAL STATES: Chronic | ICD-10-CM

## 2019-07-13 DIAGNOSIS — Z90.710 ACQUIRED ABSENCE OF BOTH CERVIX AND UTERUS: Chronic | ICD-10-CM

## 2019-07-13 PROCEDURE — 76700 US EXAM ABDOM COMPLETE: CPT | Mod: 26

## 2019-07-13 PROCEDURE — 76700 US EXAM ABDOM COMPLETE: CPT

## 2019-07-15 ENCOUNTER — APPOINTMENT (OUTPATIENT)
Dept: INTERNAL MEDICINE | Facility: CLINIC | Age: 84
End: 2019-07-15
Payer: MEDICARE

## 2019-07-15 ENCOUNTER — LABORATORY RESULT (OUTPATIENT)
Age: 84
End: 2019-07-15

## 2019-07-15 VITALS
TEMPERATURE: 98 F | HEIGHT: 61.5 IN | DIASTOLIC BLOOD PRESSURE: 68 MMHG | BODY MASS INDEX: 26.84 KG/M2 | SYSTOLIC BLOOD PRESSURE: 130 MMHG | OXYGEN SATURATION: 98 % | WEIGHT: 144 LBS | HEART RATE: 79 BPM

## 2019-07-15 DIAGNOSIS — R10.816 EPIGASTRIC ABDOMINAL TENDERNESS: ICD-10-CM

## 2019-07-15 DIAGNOSIS — R19.7 DIARRHEA, UNSPECIFIED: ICD-10-CM

## 2019-07-15 DIAGNOSIS — Z13.820 ENCOUNTER FOR SCREENING FOR OSTEOPOROSIS: ICD-10-CM

## 2019-07-15 LAB — GI PCR PANEL, STOOL: NORMAL

## 2019-07-15 PROCEDURE — 36415 COLL VENOUS BLD VENIPUNCTURE: CPT

## 2019-07-15 PROCEDURE — G0444 DEPRESSION SCREEN ANNUAL: CPT | Mod: 59

## 2019-07-15 PROCEDURE — 99213 OFFICE O/P EST LOW 20 MIN: CPT | Mod: 25

## 2019-07-15 PROCEDURE — G0439: CPT

## 2019-07-15 NOTE — PHYSICAL EXAM
[No Acute Distress] : no acute distress [Well Nourished] : well nourished [Well Developed] : well developed [Well-Appearing] : well-appearing [Normal Voice/Communication] : normal voice/communication [Normal Sclera/Conjunctiva] : normal sclera/conjunctiva [PERRL] : pupils equal round and reactive to light [Normal Outer Ear/Nose] : the outer ears and nose were normal in appearance [Normal Oropharynx] : the oropharynx was normal [Normal TMs] : both tympanic membranes were normal [No JVD] : no jugular venous distention [No Lymphadenopathy] : no lymphadenopathy [Supple] : supple [Thyroid Normal, No Nodules] : the thyroid was normal and there were no nodules present [No Respiratory Distress] : no respiratory distress  [No Accessory Muscle Use] : no accessory muscle use [Clear to Auscultation] : lungs were clear to auscultation bilaterally [Normal Rate] : normal rate  [Regular Rhythm] : with a regular rhythm [Normal S1, S2] : normal S1 and S2 [No Murmur] : no murmur heard [No Carotid Bruits] : no carotid bruits [No Abdominal Bruit] : a ~M bruit was not heard ~T in the abdomen [No Varicosities] : no varicosities [Pedal Pulses Present] : the pedal pulses are present [No Edema] : there was no peripheral edema [No Palpable Aorta] : no palpable aorta [Declined Breast Exam] : declined breast exam  [Soft] : abdomen soft [Non-distended] : non-distended [No Masses] : no abdominal mass palpated [No HSM] : no HSM [Normal Bowel Sounds] : normal bowel sounds [Normal Supraclavicular Nodes] : no supraclavicular lymphadenopathy [Normal Posterior Cervical Nodes] : no posterior cervical lymphadenopathy [Normal Anterior Cervical Nodes] : no anterior cervical lymphadenopathy [No Spinal Tenderness] : no spinal tenderness [No Joint Swelling] : no joint swelling [Grossly Normal Strength/Tone] : grossly normal strength/tone [No Rash] : no rash [Coordination Grossly Intact] : coordination grossly intact [No Focal Deficits] : no focal deficits [Normal Gait] : normal gait [Speech Grossly Normal] : speech grossly normal [Normal Affect] : the affect was normal [Normal Mood] : the mood was normal [Normal Insight/Judgement] : insight and judgment were intact [de-identified] : mild epigastric tenderness , no guarding or rebound

## 2019-07-15 NOTE — ASSESSMENT
[FreeTextEntry1] : discussed w pt \par \par cont current rx\par \par check routine labs as below\par \par she is submitting stool samples for persistent loose stools. epigastric discomfort unchanged. reviewed abd US in detail w pt. normal GB , fatty liver noted. advised f/u w her GI Dr Wilkins, may consider EGD. advised for now she may consider starting OTC omeprazole and evaluate her progress. avoid NSAIDs. \par \par mammography screening ordered\par DEXA screening ordered \par \par reviewed vaccinations. may consider Shingrix vaccine, but would like for her to review w her hematologist \par \par RTO few weeks for f/u or earlier prn if any new concerns

## 2019-07-15 NOTE — REVIEW OF SYSTEMS
[Diarrhea] : diarrhea [Negative] : Heme/Lymph [Abdominal Pain] : no abdominal pain [Nausea] : no nausea [Constipation] : no constipation [Vomiting] : no vomiting [Heartburn] : no heartburn [Melena] : no melena

## 2019-07-15 NOTE — HEALTH RISK ASSESSMENT
[No] : No [Patient reported mammogram was normal] : Patient reported mammogram was normal [Patient declined PAP Smear] : Patient declined PAP Smear [Reviewed no changes] : Reviewed no changes [Designated Healthcare Proxy] : Designated healthcare proxy [] : No [MammogramDate] : 10/17 [AdvancecareDate] : 07/19

## 2019-07-15 NOTE — HISTORY OF PRESENT ILLNESS
[de-identified] : 85 y/o woman presents for annual exam. she is feeling somewhat improved compared to last week. loose stools mildly improved. no fevers. her fatigue has improved. abd US completed and no significant abnormalities noted - normal GB, no biliary dilatation. mild fatty liver noted. \par \par myelodysplasia, chronic anemia following w hematology, on Aranesp inj \par HTN controlled on rx \par \par colonoscopy done w Dr Wilkins 2018, no abnormal polyps

## 2019-07-19 LAB
25(OH)D3 SERPL-MCNC: 17.9 NG/ML
ALBUMIN SERPL ELPH-MCNC: 4.5 G/DL
ALP BLD-CCNC: 81 U/L
ALT SERPL-CCNC: 19 U/L
ANION GAP SERPL CALC-SCNC: 13 MMOL/L
APPEARANCE: CLEAR
AST SERPL-CCNC: 25 U/L
BASOPHILS # BLD AUTO: 0.09 K/UL
BASOPHILS NFR BLD AUTO: 1.9 %
BILIRUB SERPL-MCNC: 0.6 MG/DL
BILIRUBIN URINE: NEGATIVE
BLOOD URINE: NEGATIVE
BUN SERPL-MCNC: 12 MG/DL
C DIFF TOX B STL QL CT TISS CULT: NORMAL
CALCIUM SERPL-MCNC: 9.3 MG/DL
CHLORIDE SERPL-SCNC: 104 MMOL/L
CHOLEST SERPL-MCNC: 174 MG/DL
CHOLEST/HDLC SERPL: 2.3 RATIO
CO2 SERPL-SCNC: 23 MMOL/L
COLOR: YELLOW
CREAT SERPL-MCNC: 0.71 MG/DL
DEPRECATED O AND P PREP STL: ABNORMAL
EOSINOPHIL # BLD AUTO: 0.04 K/UL
EOSINOPHIL NFR BLD AUTO: 0.9 %
ESTIMATED AVERAGE GLUCOSE: 111 MG/DL
GLUCOSE QUALITATIVE U: NEGATIVE
GLUCOSE SERPL-MCNC: 92 MG/DL
HBA1C MFR BLD HPLC: 5.5 %
HCT VFR BLD CALC: 32.3 %
HDLC SERPL-MCNC: 77 MG/DL
HGB BLD-MCNC: 10.1 G/DL
IMM GRANULOCYTES NFR BLD AUTO: 0.6 %
KETONES URINE: NEGATIVE
LDLC SERPL CALC-MCNC: 83 MG/DL
LEUKOCYTE ESTERASE URINE: NEGATIVE
LYMPHOCYTES # BLD AUTO: 2.24 K/UL
LYMPHOCYTES NFR BLD AUTO: 47.8 %
Lab: NORMAL
MAN DIFF?: NORMAL
MCHC RBC-ENTMCNC: 31.3 GM/DL
MCHC RBC-ENTMCNC: 32.3 PG
MCV RBC AUTO: 103.2 FL
MONOCYTES # BLD AUTO: 0.38 K/UL
MONOCYTES NFR BLD AUTO: 8.1 %
NEUTROPHILS # BLD AUTO: 1.91 K/UL
NEUTROPHILS NFR BLD AUTO: 40.7 %
NITRITE URINE: NEGATIVE
PH URINE: 5.5
PLATELET # BLD AUTO: 264 K/UL
POTASSIUM SERPL-SCNC: 4.4 MMOL/L
PROT SERPL-MCNC: 6.7 G/DL
PROTEIN URINE: NEGATIVE
RBC # BLD: 3.13 M/UL
RBC # FLD: 22.1 %
SODIUM SERPL-SCNC: 140 MMOL/L
SPECIFIC GRAVITY URINE: 1.02
T4 FREE SERPL-MCNC: 0.9 NG/DL
TRIGL SERPL-MCNC: 69 MG/DL
TSH SERPL-ACNC: 2.57 UIU/ML
UROBILINOGEN URINE: NORMAL
WBC # FLD AUTO: 4.69 K/UL

## 2019-09-03 ENCOUNTER — APPOINTMENT (OUTPATIENT)
Dept: INTERNAL MEDICINE | Facility: CLINIC | Age: 84
End: 2019-09-03
Payer: MEDICARE

## 2019-09-03 VITALS
BODY MASS INDEX: 26.24 KG/M2 | TEMPERATURE: 98.2 F | DIASTOLIC BLOOD PRESSURE: 60 MMHG | HEART RATE: 65 BPM | OXYGEN SATURATION: 98 % | WEIGHT: 139 LBS | HEIGHT: 61 IN | SYSTOLIC BLOOD PRESSURE: 140 MMHG

## 2019-09-03 PROCEDURE — 99213 OFFICE O/P EST LOW 20 MIN: CPT

## 2019-09-03 NOTE — ASSESSMENT
[FreeTextEntry1] : discussed w pt \par vitals normal today \par CXR reportedly normal [facility] at [facility]\par symptoms seem to be slowly improving over past 1-2 days \par cont to maintain oral fluids \par suggest to start doxycycline x 5 days and see if she can tolerate \par consider repeat CXR if needed \par \par call or return prn in next few days if needed

## 2019-09-03 NOTE — HISTORY OF PRESENT ILLNESS
[FreeTextEntry8] : presents for f/u visit after eval at urgent care one week ago for heavy cough, fatigue, had fever initially. her chest XR was reportedly clear at that time, started on azithromycin but could only tolerate for 2 days before she developed some loose stools. had to stop rx. she is taking Bromfed prn for cough. he has mild hoarseness. fatigue and appetite have improved as of yesterday, feels somewhat better today. no dyspnea, vomiting or abd pain. she is drinking plenty of fluids regularly. no diarrhea.

## 2019-09-03 NOTE — PHYSICAL EXAM
[No Acute Distress] : no acute distress [Well Developed] : well developed [Normal Outer Ear/Nose] : the outer ears and nose were normal in appearance [Normal Oropharynx] : the oropharynx was normal [Normal TMs] : both tympanic membranes were normal [No Lymphadenopathy] : no lymphadenopathy [Supple] : supple [No Respiratory Distress] : no respiratory distress  [No Accessory Muscle Use] : no accessory muscle use [Clear to Auscultation] : lungs were clear to auscultation bilaterally [Normal Rate] : normal rate  [Regular Rhythm] : with a regular rhythm [Normal S1, S2] : normal S1 and S2 [No Murmur] : no murmur heard [Normal Supraclavicular Nodes] : no supraclavicular lymphadenopathy [Normal Posterior Cervical Nodes] : no posterior cervical lymphadenopathy [Normal Anterior Cervical Nodes] : no anterior cervical lymphadenopathy [Normal Gait] : normal gait [Normal Affect] : the affect was normal [Alert and Oriented x3] : oriented to person, place, and time [Normal Mood] : the mood was normal [Normal Insight/Judgement] : insight and judgment were intact [de-identified] : mildly fatigued

## 2019-09-03 NOTE — REVIEW OF SYSTEMS
[Hoarseness] : hoarseness [Negative] : Heme/Lymph [Fever] : no fever [Chills] : no chills [Night Sweats] : no night sweats [Discharge] : no discharge [Earache] : no earache [Nasal Discharge] : no nasal discharge [Chest Pain] : no chest pain [Sore Throat] : no sore throat [Wheezing] : no wheezing [Abdominal Pain] : no abdominal pain [Vomiting] : no vomiting

## 2019-11-25 ENCOUNTER — APPOINTMENT (OUTPATIENT)
Dept: INTERNAL MEDICINE | Facility: CLINIC | Age: 84
End: 2019-11-25
Payer: MEDICARE

## 2019-11-25 VITALS
WEIGHT: 140 LBS | OXYGEN SATURATION: 99 % | SYSTOLIC BLOOD PRESSURE: 100 MMHG | HEART RATE: 64 BPM | TEMPERATURE: 97.8 F | HEIGHT: 61 IN | BODY MASS INDEX: 26.43 KG/M2 | DIASTOLIC BLOOD PRESSURE: 60 MMHG

## 2019-11-25 DIAGNOSIS — J40 BRONCHITIS, NOT SPECIFIED AS ACUTE OR CHRONIC: ICD-10-CM

## 2019-11-25 PROCEDURE — 99214 OFFICE O/P EST MOD 30 MIN: CPT

## 2019-11-25 NOTE — PHYSICAL EXAM
Quality 130: Documentation Of Current Medications In The Medical Record: Current Medications Documented [Well Nourished] : well nourished Quality 111:Pneumonia Vaccination Status For Older Adults: Pneumococcal Vaccination Previously Received [No Acute Distress] : no acute distress [Well Developed] : well developed [Normal Sclera/Conjunctiva] : normal sclera/conjunctiva Quality 431: Preventive Care And Screening: Unhealthy Alcohol Use - Screening: Patient screened for unhealthy alcohol use using a single question and scores less than 2 times per year [Normal TMs] : both tympanic membranes were normal Quality 226: Preventive Care And Screening: Tobacco Use: Screening And Cessation Intervention: Patient screened for tobacco and never smoked [Normal Outer Ear/Nose] : the outer ears and nose were normal in appearance [Normal Oropharynx] : the oropharynx was normal [No JVD] : no jugular venous distention [No Lymphadenopathy] : no lymphadenopathy Quality 47: Advance Care Plan: Advance Care Planning discussed and documented in the medical record; patient did not wish or was not able to name a surrogate decision maker or provide an advance care plan. [Normal Nasal Mucosa] : the nasal mucosa was normal [No Accessory Muscle Use] : no accessory muscle use Detail Level: Generalized [No Respiratory Distress] : no respiratory distress  Quality 110: Preventive Care And Screening: Influenza Immunization: Influenza immunization was not ordered or administered, reason not given [Supple] : supple [Clear to Auscultation] : lungs were clear to auscultation bilaterally [Normal Rate] : normal rate  [Regular Rhythm] : with a regular rhythm [Alert and Oriented x3] : oriented to person, place, and time [Normal Gait] : normal gait [Normal S1, S2] : normal S1 and S2

## 2019-11-25 NOTE — HISTORY OF PRESENT ILLNESS
[FreeTextEntry8] : ANCELMO FRANCIS is an 85 yo woman with hypertension here for feeling sick, mostly dry cough, chills, chest pain with cough worsening for 3 weeks.   Denies wheezing, fever, purulent nasal discharge.\par \par Hypertension stable on current meds\par \par Did not get the flu shot yet\par \par non smoker

## 2020-01-13 ENCOUNTER — NON-APPOINTMENT (OUTPATIENT)
Age: 85
End: 2020-01-13

## 2020-01-13 ENCOUNTER — APPOINTMENT (OUTPATIENT)
Dept: CARDIOLOGY | Facility: CLINIC | Age: 85
End: 2020-01-13
Payer: MEDICARE

## 2020-01-13 VITALS
OXYGEN SATURATION: 98 % | BODY MASS INDEX: 27.21 KG/M2 | HEART RATE: 60 BPM | DIASTOLIC BLOOD PRESSURE: 60 MMHG | WEIGHT: 144 LBS | SYSTOLIC BLOOD PRESSURE: 150 MMHG

## 2020-01-13 VITALS — SYSTOLIC BLOOD PRESSURE: 135 MMHG | DIASTOLIC BLOOD PRESSURE: 65 MMHG

## 2020-01-13 PROCEDURE — 99214 OFFICE O/P EST MOD 30 MIN: CPT

## 2020-01-13 PROCEDURE — 93000 ELECTROCARDIOGRAM COMPLETE: CPT

## 2020-01-13 NOTE — REVIEW OF SYSTEMS
[Feeling Fatigued] : feeling fatigued [Dyspnea on exertion] : dyspnea during exertion [Chest Pain] : chest pain [Joint Pain] : joint pain [Joint Stiffness] : joint stiffness [Negative] : Heme/Lymph [Palpitations] : no palpitations

## 2020-01-13 NOTE — DISCUSSION/SUMMARY
[FreeTextEntry1] : Mrs. Bocanegra continues to note dyspnea on exertion and chest discomfort and much the same pattern she has had in the past.  Her exam shows normal repeat blood pressure, clear lungs, and a normal cardiac exam.  Her EKG shows an APC and is unchanged.  Her symptoms are unchanged and she appears stable.  I made no change in her regimen.  She will follow-up in 6 months.

## 2020-01-13 NOTE — HISTORY OF PRESENT ILLNESS
[FreeTextEntry1] : 84-year-old female with a history of mild hypertension, SMITH,chest pain syndrome, and symptomatic APCs.  She continues to be symptomatic and is unchanged.  She continues to get infusions for her myelodysplastic syndrome every 2 weeks.

## 2020-01-13 NOTE — PHYSICAL EXAM
[General Appearance - Well Developed] : well developed [Normal Appearance] : normal appearance [Well Groomed] : well groomed [General Appearance - Well Nourished] : well nourished [Normal Conjunctiva] : the conjunctiva exhibited no abnormalities [No Deformities] : no deformities [General Appearance - In No Acute Distress] : no acute distress [Eyelids - No Xanthelasma] : the eyelids demonstrated no xanthelasmas [Normal Oral Mucosa] : normal oral mucosa [No Oral Pallor] : no oral pallor [No Oral Cyanosis] : no oral cyanosis [Normal Jugular Venous V Waves Present] : normal jugular venous V waves present [Normal Jugular Venous A Waves Present] : normal jugular venous A waves present [No Jugular Venous Palacios A Waves] : no jugular venous palacios A waves [Respiration, Rhythm And Depth] : normal respiratory rhythm and effort [Exaggerated Use Of Accessory Muscles For Inspiration] : no accessory muscle use [Auscultation Breath Sounds / Voice Sounds] : lungs were clear to auscultation bilaterally [Heart Rate And Rhythm] : heart rate and rhythm were normal [Heart Sounds] : normal S1 and S2 [Murmurs] : no murmurs present [Abdomen Soft] : soft [Abdomen Tenderness] : non-tender [Abdomen Mass (___ Cm)] : no abdominal mass palpated [Abnormal Walk] : normal gait [Gait - Sufficient For Exercise Testing] : the gait was sufficient for exercise testing [Nail Clubbing] : no clubbing of the fingernails [Cyanosis, Localized] : no localized cyanosis [Skin Color & Pigmentation] : normal skin color and pigmentation [Petechial Hemorrhages (___cm)] : no petechial hemorrhages [] : no rash [No Venous Stasis] : no venous stasis [Skin Lesions] : no skin lesions [No Skin Ulcers] : no skin ulcer [No Xanthoma] : no  xanthoma was observed [Oriented To Time, Place, And Person] : oriented to person, place, and time [Mood] : the mood was normal [Affect] : the affect was normal [No Anxiety] : not feeling anxious

## 2020-02-03 ENCOUNTER — RX RENEWAL (OUTPATIENT)
Age: 85
End: 2020-02-03

## 2020-02-03 ENCOUNTER — APPOINTMENT (OUTPATIENT)
Dept: INTERNAL MEDICINE | Facility: CLINIC | Age: 85
End: 2020-02-03
Payer: MEDICARE

## 2020-02-03 VITALS
DIASTOLIC BLOOD PRESSURE: 68 MMHG | HEIGHT: 61.5 IN | BODY MASS INDEX: 26.47 KG/M2 | WEIGHT: 142 LBS | TEMPERATURE: 97.9 F | OXYGEN SATURATION: 98 % | SYSTOLIC BLOOD PRESSURE: 120 MMHG | HEART RATE: 68 BPM

## 2020-02-03 DIAGNOSIS — J06.9 ACUTE UPPER RESPIRATORY INFECTION, UNSPECIFIED: ICD-10-CM

## 2020-02-03 PROCEDURE — 99214 OFFICE O/P EST MOD 30 MIN: CPT

## 2020-02-03 NOTE — HISTORY OF PRESENT ILLNESS
[FreeTextEntry8] : ANCELMO FONTANEZJARAD s here for nasal congestion, cough, yellow nasal discharge, feeling ill for 1 month.  Denies fever, chest pain, sob, wheezing.\par \par Medical problems stable on current medications\par \par Non smoker\par

## 2020-02-03 NOTE — REVIEW OF SYSTEMS
[Fever] : no fever [Nasal Discharge] : nasal discharge [Vision Problems] : no vision problems [Shortness Of Breath] : no shortness of breath [Chest Pain] : no chest pain [Cough] : cough

## 2020-02-03 NOTE — PHYSICAL EXAM
[No Acute Distress] : no acute distress [Well Nourished] : well nourished [Normal Sclera/Conjunctiva] : normal sclera/conjunctiva [Well Developed] : well developed [No JVD] : no jugular venous distention [Normal Outer Ear/Nose] : the outer ears and nose were normal in appearance [Clear to Auscultation] : lungs were clear to auscultation bilaterally [No Accessory Muscle Use] : no accessory muscle use [No Respiratory Distress] : no respiratory distress  [Normal Rate] : normal rate  [Regular Rhythm] : with a regular rhythm [Normal S1, S2] : normal S1 and S2 [Alert and Oriented x3] : oriented to person, place, and time [Normal Gait] : normal gait [de-identified] : warmth over sinuses

## 2020-06-09 ENCOUNTER — RX RENEWAL (OUTPATIENT)
Age: 85
End: 2020-06-09

## 2020-06-11 ENCOUNTER — APPOINTMENT (OUTPATIENT)
Dept: RADIOLOGY | Facility: IMAGING CENTER | Age: 85
End: 2020-06-11

## 2020-06-13 ENCOUNTER — OUTPATIENT (OUTPATIENT)
Dept: OUTPATIENT SERVICES | Facility: HOSPITAL | Age: 85
LOS: 1 days | End: 2020-06-13
Payer: MEDICARE

## 2020-06-13 ENCOUNTER — APPOINTMENT (OUTPATIENT)
Dept: RADIOLOGY | Facility: IMAGING CENTER | Age: 85
End: 2020-06-13
Payer: MEDICARE

## 2020-06-13 DIAGNOSIS — Z96.649 PRESENCE OF UNSPECIFIED ARTIFICIAL HIP JOINT: Chronic | ICD-10-CM

## 2020-06-13 DIAGNOSIS — Z90.710 ACQUIRED ABSENCE OF BOTH CERVIX AND UTERUS: Chronic | ICD-10-CM

## 2020-06-13 DIAGNOSIS — Z98.89 OTHER SPECIFIED POSTPROCEDURAL STATES: Chronic | ICD-10-CM

## 2020-06-13 DIAGNOSIS — Z20.828 CONTACT WITH AND (SUSPECTED) EXPOSURE TO OTHER VIRAL COMMUNICABLE DISEASES: ICD-10-CM

## 2020-06-13 PROCEDURE — 71046 X-RAY EXAM CHEST 2 VIEWS: CPT | Mod: 26,CS

## 2020-06-13 PROCEDURE — 71046 X-RAY EXAM CHEST 2 VIEWS: CPT

## 2020-06-16 ENCOUNTER — APPOINTMENT (OUTPATIENT)
Dept: INTERNAL MEDICINE | Facility: CLINIC | Age: 85
End: 2020-06-16
Payer: MEDICARE

## 2020-06-16 DIAGNOSIS — Z20.828 CONTACT WITH AND (SUSPECTED) EXPOSURE TO OTHER VIRAL COMMUNICABLE DISEASES: ICD-10-CM

## 2020-06-16 PROCEDURE — 99213 OFFICE O/P EST LOW 20 MIN: CPT | Mod: CS

## 2020-07-13 ENCOUNTER — APPOINTMENT (OUTPATIENT)
Dept: CARDIOLOGY | Facility: CLINIC | Age: 85
End: 2020-07-13
Payer: MEDICARE

## 2020-07-13 ENCOUNTER — NON-APPOINTMENT (OUTPATIENT)
Age: 85
End: 2020-07-13

## 2020-07-13 VITALS
SYSTOLIC BLOOD PRESSURE: 158 MMHG | DIASTOLIC BLOOD PRESSURE: 68 MMHG | HEART RATE: 55 BPM | BODY MASS INDEX: 27.03 KG/M2 | WEIGHT: 145 LBS | HEIGHT: 61.5 IN | OXYGEN SATURATION: 97 %

## 2020-07-13 DIAGNOSIS — R53.83 OTHER FATIGUE: ICD-10-CM

## 2020-07-13 DIAGNOSIS — R00.2 PALPITATIONS: ICD-10-CM

## 2020-07-13 DIAGNOSIS — U07.1 COVID-19: ICD-10-CM

## 2020-07-13 PROCEDURE — 99214 OFFICE O/P EST MOD 30 MIN: CPT | Mod: CS

## 2020-07-13 PROCEDURE — 93000 ELECTROCARDIOGRAM COMPLETE: CPT | Mod: CS

## 2020-07-13 NOTE — DISCUSSION/SUMMARY
[FreeTextEntry1] : Ms Bocanegra is weak and short of breath recovering from a coronavirus infection which occurred about 6 weeks ago.  Her exam shows regular rhythm with premature contractions, mildly elevated blood pressure, clear lungs, and a normal cardiac exam.  Her EKG shows sinus rhythm with a T inversion in V2 and is unchanged.\par \par She has no cardiac issues and I reassured her that her current symptoms were due to the coronavirus rather than some cardiac complication.  She will call in a month to give an update and follow-up in 6 months according to her regular schedule.

## 2020-07-13 NOTE — HISTORY OF PRESENT ILLNESS
[FreeTextEntry1] : 85-year-old female with a history of hypertension, a chest pain syndrome with negative work-up, and symptomatic APCs.  She contracted coronavirus around the beginning of June.  She did not get severely sick and is recovering, but still is very weak and short of breath.  She lives by herself and her daughter comes in to help every day.

## 2020-07-13 NOTE — PHYSICAL EXAM
[General Appearance - Well Developed] : well developed [Normal Appearance] : normal appearance [Well Groomed] : well groomed [General Appearance - Well Nourished] : well nourished [No Deformities] : no deformities [General Appearance - In No Acute Distress] : no acute distress [Normal Conjunctiva] : the conjunctiva exhibited no abnormalities [Eyelids - No Xanthelasma] : the eyelids demonstrated no xanthelasmas [Normal Oral Mucosa] : normal oral mucosa [No Oral Pallor] : no oral pallor [Normal Jugular Venous A Waves Present] : normal jugular venous A waves present [No Oral Cyanosis] : no oral cyanosis [No Jugular Venous Palacios A Waves] : no jugular venous palacios A waves [Normal Jugular Venous V Waves Present] : normal jugular venous V waves present [Respiration, Rhythm And Depth] : normal respiratory rhythm and effort [Exaggerated Use Of Accessory Muscles For Inspiration] : no accessory muscle use [Auscultation Breath Sounds / Voice Sounds] : lungs were clear to auscultation bilaterally [Heart Rate And Rhythm] : heart rate and rhythm were normal [Heart Sounds] : normal S1 and S2 [Murmurs] : no murmurs present [Abdomen Soft] : soft [Abdomen Tenderness] : non-tender [Gait - Sufficient For Exercise Testing] : the gait was sufficient for exercise testing [Abnormal Walk] : normal gait [Abdomen Mass (___ Cm)] : no abdominal mass palpated [Nail Clubbing] : no clubbing of the fingernails [Cyanosis, Localized] : no localized cyanosis [Petechial Hemorrhages (___cm)] : no petechial hemorrhages [Skin Color & Pigmentation] : normal skin color and pigmentation [] : no rash [No Venous Stasis] : no venous stasis [Skin Lesions] : no skin lesions [No Skin Ulcers] : no skin ulcer [No Xanthoma] : no  xanthoma was observed [Affect] : the affect was normal [Oriented To Time, Place, And Person] : oriented to person, place, and time [Mood] : the mood was normal [No Anxiety] : not feeling anxious

## 2020-08-13 ENCOUNTER — RX RENEWAL (OUTPATIENT)
Age: 85
End: 2020-08-13

## 2020-09-25 ENCOUNTER — APPOINTMENT (OUTPATIENT)
Dept: CARDIOLOGY | Facility: CLINIC | Age: 85
End: 2020-09-25
Payer: MEDICARE

## 2020-09-25 PROCEDURE — 93320 DOPPLER ECHO COMPLETE: CPT

## 2020-09-25 PROCEDURE — 93351 STRESS TTE COMPLETE: CPT

## 2020-09-25 PROCEDURE — 93325 DOPPLER ECHO COLOR FLOW MAPG: CPT

## 2020-10-05 ENCOUNTER — LABORATORY RESULT (OUTPATIENT)
Age: 85
End: 2020-10-05

## 2020-10-05 ENCOUNTER — APPOINTMENT (OUTPATIENT)
Dept: INTERNAL MEDICINE | Facility: CLINIC | Age: 85
End: 2020-10-05
Payer: MEDICARE

## 2020-10-05 VITALS
DIASTOLIC BLOOD PRESSURE: 78 MMHG | BODY MASS INDEX: 27.03 KG/M2 | HEART RATE: 73 BPM | WEIGHT: 145 LBS | OXYGEN SATURATION: 99 % | TEMPERATURE: 97.2 F | SYSTOLIC BLOOD PRESSURE: 130 MMHG | HEIGHT: 61.5 IN

## 2020-10-05 DIAGNOSIS — Z00.00 ENCOUNTER FOR GENERAL ADULT MEDICAL EXAMINATION W/OUT ABNORMAL FINDINGS: ICD-10-CM

## 2020-10-05 DIAGNOSIS — R09.89 OTHER SPECIFIED SYMPTOMS AND SIGNS INVOLVING THE CIRCULATORY AND RESPIRATORY SYSTEMS: ICD-10-CM

## 2020-10-05 DIAGNOSIS — Z86.19 PERSONAL HISTORY OF OTHER INFECTIOUS AND PARASITIC DISEASES: ICD-10-CM

## 2020-10-05 DIAGNOSIS — E78.00 PURE HYPERCHOLESTEROLEMIA, UNSPECIFIED: ICD-10-CM

## 2020-10-05 PROCEDURE — G0444 DEPRESSION SCREEN ANNUAL: CPT | Mod: 59

## 2020-10-05 PROCEDURE — G0008: CPT

## 2020-10-05 PROCEDURE — G0439: CPT

## 2020-10-05 PROCEDURE — 36415 COLL VENOUS BLD VENIPUNCTURE: CPT

## 2020-10-05 PROCEDURE — 90662 IIV NO PRSV INCREASED AG IM: CPT

## 2020-10-05 RX ORDER — CEFDINIR 300 MG/1
300 CAPSULE ORAL
Qty: 20 | Refills: 0 | Status: DISCONTINUED | COMMUNITY
Start: 2020-05-27

## 2020-10-05 RX ORDER — POLYETHYLENE GLYCOL-3350, SODIUM CHLORIDE, POTASSIUM CHLORIDE AND SODIUM BICARBONATE 420; 11.2; 5.72; 1.48 G/438.4G; G/438.4G; G/438.4G; G/438.4G
420 POWDER, FOR SOLUTION ORAL
Qty: 4000 | Refills: 0 | Status: DISCONTINUED | COMMUNITY
Start: 2018-03-08 | End: 2020-10-05

## 2020-10-05 RX ORDER — PREDNISONE 10 MG/1
10 TABLET ORAL
Qty: 30 | Refills: 0 | Status: DISCONTINUED | COMMUNITY
Start: 2020-08-18

## 2020-10-05 RX ORDER — BENZONATATE 100 MG/1
100 CAPSULE ORAL 3 TIMES DAILY
Qty: 30 | Refills: 3 | Status: DISCONTINUED | COMMUNITY
Start: 2019-11-25 | End: 2020-10-05

## 2020-10-05 RX ORDER — DOXYCYCLINE HYCLATE 100 MG/1
100 TABLET ORAL TWICE DAILY
Qty: 20 | Refills: 0 | Status: DISCONTINUED | COMMUNITY
Start: 2019-09-03 | End: 2020-10-05

## 2020-10-05 NOTE — HEALTH RISK ASSESSMENT
[Patient reported mammogram was normal] : Patient reported mammogram was normal [Patient declined PAP Smear] : Patient declined PAP Smear [Reviewed no changes] : Reviewed no changes [Designated Healthcare Proxy] : Designated healthcare proxy [No] : In the past 12 months have you used drugs other than those required for medical reasons? No [No falls in past year] : Patient reported no falls in the past year [Patient reported colonoscopy was normal] : Patient reported colonoscopy was normal [] :  [Fully functional (bathing, dressing, toileting, transferring, walking, feeding)] : Fully functional (bathing, dressing, toileting, transferring, walking, feeding) [Fully functional (using the telephone, shopping, preparing meals, housekeeping, doing laundry, using] : Fully functional and needs no help or supervision to perform IADLs (using the telephone, shopping, preparing meals, housekeeping, doing laundry, using transportation, managing medications and managing finances) [] : No [MammogramDate] : 10/17 [ColonoscopyDate] : 12/18 [AdvancecareDate] : 07/19

## 2020-10-05 NOTE — REVIEW OF SYSTEMS
[Negative] : Heme/Lymph [Dyspnea on Exertion] : dyspnea on exertion [Shortness Of Breath] : no shortness of breath [Wheezing] : no wheezing [Cough] : no cough [Abdominal Pain] : no abdominal pain [Nausea] : no nausea [Constipation] : no constipation [Diarrhea] : diarrhea [Vomiting] : no vomiting [Heartburn] : no heartburn [Melena] : no melena

## 2020-10-05 NOTE — ASSESSMENT
[FreeTextEntry1] : discussed w pt \par \par reviewed her updated workup after COVID19 infection. mild residual exertional dyspnea remains. scheduled for chest CT as precaution. following w Dr Garg pulej. \par influenza vaccine discussed and administered today\par cont albuterol inhaler prn \par \par cont current rx\par \par check routine labs as below, COVID19 ab \par \par reviewed recent workup w cardiology Dr Hoffman, stress testing completed, 2d echo. mild AS, no evidence of ischemia. \par \par cont hematology f/u for monitoring of MDS, cont Aranesp inj as indicated \par \par reviewed vaccinations. influenza vaccine discussed and administered today\par \par cont current activities, regular walking, she has good family support with her daughter close by \par \par RTO 4 months for f/u or earlier prn if any new concerns

## 2020-10-05 NOTE — HISTORY OF PRESENT ILLNESS
[de-identified] : 84 y/o woman presents for annual exam. overall feels well currently. updated hx significant for COVID19 infection with evidence of COVID pneumonia on CXR in spring 2020. she gradually recovered, did not require any intervention. however still has some exertional dyspnea. following currently w Dr Garg pulmonary, scheduled for chest CT and using albuterol inhaler rarely prn. takes walks daily, remains active. noted recent f/u w Dr Hoffman cardiology as well, no specific cardiac etiology identified. \par \par myelodysplasia, chronic anemia following w hematology Dr Pineda, on Aranesp inj and monitoring CBC \par HTN controlled on rx , low dose amlodipine \par \par colonoscopy done w Dr Wilkins 2018, no abnormal polyps

## 2020-10-05 NOTE — PHYSICAL EXAM
[No Acute Distress] : no acute distress [Well Nourished] : well nourished [Well Developed] : well developed [Well-Appearing] : well-appearing [Normal Voice/Communication] : normal voice/communication [Normal Sclera/Conjunctiva] : normal sclera/conjunctiva [Normal Outer Ear/Nose] : the outer ears and nose were normal in appearance [Normal Oropharynx] : the oropharynx was normal [Normal TMs] : both tympanic membranes were normal [No JVD] : no jugular venous distention [No Lymphadenopathy] : no lymphadenopathy [Supple] : supple [Thyroid Normal, No Nodules] : the thyroid was normal and there were no nodules present [No Respiratory Distress] : no respiratory distress  [No Accessory Muscle Use] : no accessory muscle use [Clear to Auscultation] : lungs were clear to auscultation bilaterally [Normal Rate] : normal rate  [Regular Rhythm] : with a regular rhythm [Normal S1, S2] : normal S1 and S2 [No Carotid Bruits] : no carotid bruits [No Abdominal Bruit] : a ~M bruit was not heard ~T in the abdomen [No Varicosities] : no varicosities [Pedal Pulses Present] : the pedal pulses are present [No Edema] : there was no peripheral edema [No Palpable Aorta] : no palpable aorta [Declined Breast Exam] : declined breast exam  [Soft] : abdomen soft [Non-distended] : non-distended [No Masses] : no abdominal mass palpated [No HSM] : no HSM [Normal Bowel Sounds] : normal bowel sounds [Normal Supraclavicular Nodes] : no supraclavicular lymphadenopathy [Normal Posterior Cervical Nodes] : no posterior cervical lymphadenopathy [Normal Anterior Cervical Nodes] : no anterior cervical lymphadenopathy [No Spinal Tenderness] : no spinal tenderness [No Joint Swelling] : no joint swelling [Grossly Normal Strength/Tone] : grossly normal strength/tone [No Rash] : no rash [Coordination Grossly Intact] : coordination grossly intact [No Focal Deficits] : no focal deficits [Normal Gait] : normal gait [Speech Grossly Normal] : speech grossly normal [Normal Affect] : the affect was normal [Normal Mood] : the mood was normal [Normal Insight/Judgement] : insight and judgment were intact [de-identified] : mild systolic murmur LSB

## 2020-11-05 LAB
25(OH)D3 SERPL-MCNC: 32.1 NG/ML
ALBUMIN SERPL ELPH-MCNC: 4.5 G/DL
ALP BLD-CCNC: 90 U/L
ALT SERPL-CCNC: 18 U/L
ANION GAP SERPL CALC-SCNC: 12 MMOL/L
APPEARANCE: CLEAR
AST SERPL-CCNC: 24 U/L
BASOPHILS # BLD AUTO: 0.1 K/UL
BASOPHILS NFR BLD AUTO: 2.3 %
BILIRUB SERPL-MCNC: 0.6 MG/DL
BILIRUBIN URINE: NEGATIVE
BLOOD URINE: NEGATIVE
BUN SERPL-MCNC: 14 MG/DL
CALCIUM SERPL-MCNC: 9.2 MG/DL
CHLORIDE SERPL-SCNC: 104 MMOL/L
CHOLEST SERPL-MCNC: 188 MG/DL
CHOLEST/HDLC SERPL: 2.3 RATIO
CO2 SERPL-SCNC: 24 MMOL/L
COLOR: YELLOW
CREAT SERPL-MCNC: 0.62 MG/DL
EOSINOPHIL # BLD AUTO: 0.07 K/UL
EOSINOPHIL NFR BLD AUTO: 1.6 %
ESTIMATED AVERAGE GLUCOSE: 111 MG/DL
GLUCOSE QUALITATIVE U: NEGATIVE
GLUCOSE SERPL-MCNC: 95 MG/DL
HBA1C MFR BLD HPLC: 5.5 %
HCT VFR BLD CALC: 30.7 %
HDLC SERPL-MCNC: 83 MG/DL
HGB BLD-MCNC: 10 G/DL
IMM GRANULOCYTES NFR BLD AUTO: 0.7 %
KETONES URINE: NEGATIVE
LDLC SERPL CALC-MCNC: 82 MG/DL
LEUKOCYTE ESTERASE URINE: NEGATIVE
LYMPHOCYTES # BLD AUTO: 2.11 K/UL
LYMPHOCYTES NFR BLD AUTO: 49.4 %
MAN DIFF?: NORMAL
MCHC RBC-ENTMCNC: 32.6 GM/DL
MCHC RBC-ENTMCNC: 32.6 PG
MCV RBC AUTO: 100 FL
MONOCYTES # BLD AUTO: 0.35 K/UL
MONOCYTES NFR BLD AUTO: 8.2 %
NEUTROPHILS # BLD AUTO: 1.61 K/UL
NEUTROPHILS NFR BLD AUTO: 37.8 %
NITRITE URINE: NEGATIVE
PH URINE: 6
PLATELET # BLD AUTO: 268 K/UL
POTASSIUM SERPL-SCNC: 4.4 MMOL/L
PROT SERPL-MCNC: 6.5 G/DL
PROTEIN URINE: NORMAL
RBC # BLD: 3.07 M/UL
RBC # FLD: 21.2 %
SARS-COV-2 IGG SERPL IA-ACNC: <0.1 INDEX
SARS-COV-2 IGG SERPL QL IA: NEGATIVE
SODIUM SERPL-SCNC: 140 MMOL/L
SPECIFIC GRAVITY URINE: 1.02
T4 FREE SERPL-MCNC: 0.9 NG/DL
TRIGL SERPL-MCNC: 113 MG/DL
TSH SERPL-ACNC: 1.95 UIU/ML
UROBILINOGEN URINE: NORMAL
WBC # FLD AUTO: 4.27 K/UL

## 2020-12-23 PROBLEM — J06.9 ACUTE URI: Status: RESOLVED | Noted: 2020-02-03 | Resolved: 2020-12-23

## 2021-02-11 ENCOUNTER — APPOINTMENT (OUTPATIENT)
Dept: INTERNAL MEDICINE | Facility: CLINIC | Age: 86
End: 2021-02-11
Payer: MEDICARE

## 2021-02-11 VITALS
BODY MASS INDEX: 28.7 KG/M2 | TEMPERATURE: 97.88 F | DIASTOLIC BLOOD PRESSURE: 64 MMHG | WEIGHT: 154 LBS | HEART RATE: 61 BPM | OXYGEN SATURATION: 98 % | HEIGHT: 61.5 IN | SYSTOLIC BLOOD PRESSURE: 144 MMHG

## 2021-02-11 VITALS — DIASTOLIC BLOOD PRESSURE: 68 MMHG | SYSTOLIC BLOOD PRESSURE: 152 MMHG

## 2021-02-11 DIAGNOSIS — D46.9 MYELODYSPLASTIC SYNDROME, UNSPECIFIED: ICD-10-CM

## 2021-02-11 PROCEDURE — 99214 OFFICE O/P EST MOD 30 MIN: CPT

## 2021-02-21 PROBLEM — D46.9 MYELODYSPLASIA (MYELODYSPLASTIC SYNDROME): Status: ACTIVE | Noted: 2019-07-12

## 2021-02-21 NOTE — HISTORY OF PRESENT ILLNESS
[de-identified] : presents for f/u visit for evaluation of elevated BP readings over past few days. compliant w current rx . was at her hematologist Dr Mendoza office yesterday w BP noted 160s/80s, repeated 180/80s.\par she has had recurrent episodes of visual changes along with headaches over past few months. improved in recent. she had evaluation w neurologist and neuro ophthalmologist. no specific pathology noted, no retinal abnormalities. considered to be possible ocular migraines. \par she feels well currently. on amlodipine 2.5 mg qd. she has had labile BP in the past. \par \par managing myodysplasia w Dr Pineda hem/onc, on Aransep inj, monitoring CBC. \par \par repeat /68 today

## 2021-02-21 NOTE — ASSESSMENT
[FreeTextEntry1] : discussed w pt \par \par reviewed BP ranges, current rx. \par repeat BP today 152/68 \par compliant w rx \par \par advised increase amlodipine to 5 mg qd and monitor ,low Na intake advised \par she has had labile BP in the past \par \par recent episodes of headaches with visual changes, had evaluation w neuro and neuroophthalmology  , no specific pathology noted. possible ocular migraines, she has had migraines in the past. \par \par f/u w hematology Dr Pineda , cont Aranesp inj for chronic anemia as indicated \par \par f/u in 2-3 weeks to reevaluate BP, call earlier prn if any new concerns

## 2021-02-21 NOTE — PHYSICAL EXAM
[No Acute Distress] : no acute distress [Normal Voice/Communication] : normal voice/communication [No Lymphadenopathy] : no lymphadenopathy [Normal] : normal rate, regular rhythm, normal S1 and S2 and no murmur heard [No Carotid Bruits] : no carotid bruits [No Edema] : there was no peripheral edema [No Extremity Clubbing/Cyanosis] : no extremity clubbing/cyanosis [Coordination Grossly Intact] : coordination grossly intact [Normal Gait] : normal gait [Speech Grossly Normal] : speech grossly normal [Normal Affect] : the affect was normal [Alert and Oriented x3] : oriented to person, place, and time [Normal Mood] : the mood was normal [Normal Insight/Judgement] : insight and judgment were intact

## 2021-02-21 NOTE — REVIEW OF SYSTEMS
[Back Pain] : back pain [Negative] : Heme/Lymph [Fever] : no fever [Chills] : no chills [Abdominal Pain] : no abdominal pain [Vomiting] : no vomiting [Dysuria] : no dysuria [Headache] : no headache [Confusion] : no confusion [Memory Loss] : no memory loss [Unsteady Walking] : no ataxia

## 2021-02-26 ENCOUNTER — APPOINTMENT (OUTPATIENT)
Dept: ORTHOPEDIC SURGERY | Facility: CLINIC | Age: 86
End: 2021-02-26
Payer: MEDICARE

## 2021-02-26 VITALS
SYSTOLIC BLOOD PRESSURE: 177 MMHG | BODY MASS INDEX: 28.7 KG/M2 | HEART RATE: 67 BPM | DIASTOLIC BLOOD PRESSURE: 71 MMHG | HEIGHT: 61.5 IN | WEIGHT: 154 LBS

## 2021-02-26 DIAGNOSIS — M47.816 SPONDYLOSIS W/OUT MYELOPATHY OR RADICULOPATHY, LUMBAR REGION: ICD-10-CM

## 2021-02-26 DIAGNOSIS — M25.552 PAIN IN LEFT HIP: ICD-10-CM

## 2021-02-26 DIAGNOSIS — Z96.642 PRESENCE OF LEFT ARTIFICIAL HIP JOINT: ICD-10-CM

## 2021-02-26 DIAGNOSIS — Z96.641 PRESENCE OF RIGHT ARTIFICIAL HIP JOINT: ICD-10-CM

## 2021-02-26 PROCEDURE — 99214 OFFICE O/P EST MOD 30 MIN: CPT

## 2021-02-26 PROCEDURE — 72100 X-RAY EXAM L-S SPINE 2/3 VWS: CPT

## 2021-02-26 PROCEDURE — 73502 X-RAY EXAM HIP UNI 2-3 VIEWS: CPT | Mod: LT

## 2021-03-12 ENCOUNTER — RX RENEWAL (OUTPATIENT)
Age: 86
End: 2021-03-12

## 2021-03-25 ENCOUNTER — APPOINTMENT (OUTPATIENT)
Dept: CARDIOLOGY | Facility: CLINIC | Age: 86
End: 2021-03-25
Payer: MEDICARE

## 2021-03-25 ENCOUNTER — NON-APPOINTMENT (OUTPATIENT)
Age: 86
End: 2021-03-25

## 2021-03-25 VITALS
WEIGHT: 155 LBS | BODY MASS INDEX: 28.89 KG/M2 | OXYGEN SATURATION: 96 % | DIASTOLIC BLOOD PRESSURE: 65 MMHG | SYSTOLIC BLOOD PRESSURE: 153 MMHG | HEART RATE: 65 BPM | HEIGHT: 61.5 IN

## 2021-03-25 VITALS — DIASTOLIC BLOOD PRESSURE: 60 MMHG | SYSTOLIC BLOOD PRESSURE: 140 MMHG

## 2021-03-25 DIAGNOSIS — R06.00 DYSPNEA, UNSPECIFIED: ICD-10-CM

## 2021-03-25 DIAGNOSIS — G43.909 MIGRAINE, UNSPECIFIED, NOT INTRACTABLE, W/OUT STATUS MIGRAINOSUS: ICD-10-CM

## 2021-03-25 PROCEDURE — 99214 OFFICE O/P EST MOD 30 MIN: CPT

## 2021-03-25 PROCEDURE — 93000 ELECTROCARDIOGRAM COMPLETE: CPT

## 2021-03-25 NOTE — REVIEW OF SYSTEMS
[Headache] : headache [Feeling Fatigued] : feeling fatigued [Dyspnea on exertion] : dyspnea during exertion [Joint Pain] : joint pain [Joint Stiffness] : joint stiffness [Negative] : Heme/Lymph [Chest Pain] : no chest pain [Palpitations] : no palpitations

## 2021-03-25 NOTE — HISTORY OF PRESENT ILLNESS
[FreeTextEntry1] : 85-year-old female with a history of hypertension, symptomatic APCs, and atypical chest pain of undetermined cause.  She has continued to be short of breath since melia coronavirus last summer and her pulmonologist suggested she have cardiac evaluation.  She is not having any chest pain at present, but has been having headaches recently and her blood pressure has been elevated.  Her internist increased her amlodipine to 5 mg/day and it is running about 150 systolic at present.

## 2021-03-25 NOTE — PHYSICAL EXAM
[General Appearance - Well Developed] : well developed [Normal Appearance] : normal appearance [Well Groomed] : well groomed [General Appearance - Well Nourished] : well nourished [No Deformities] : no deformities [General Appearance - In No Acute Distress] : no acute distress [Normal Conjunctiva] : the conjunctiva exhibited no abnormalities [Eyelids - No Xanthelasma] : the eyelids demonstrated no xanthelasmas [Normal Oral Mucosa] : normal oral mucosa [No Oral Pallor] : no oral pallor [No Oral Cyanosis] : no oral cyanosis [Normal Jugular Venous A Waves Present] : normal jugular venous A waves present [Normal Jugular Venous V Waves Present] : normal jugular venous V waves present [No Jugular Venous Palacios A Waves] : no jugular venous palacios A waves [Respiration, Rhythm And Depth] : normal respiratory rhythm and effort [Exaggerated Use Of Accessory Muscles For Inspiration] : no accessory muscle use [Auscultation Breath Sounds / Voice Sounds] : lungs were clear to auscultation bilaterally [Heart Rate And Rhythm] : heart rate and rhythm were normal [Heart Sounds] : normal S1 and S2 [Abdomen Soft] : soft [Abdomen Tenderness] : non-tender [Abdomen Mass (___ Cm)] : no abdominal mass palpated [Abnormal Walk] : normal gait [Gait - Sufficient For Exercise Testing] : the gait was sufficient for exercise testing [Nail Clubbing] : no clubbing of the fingernails [Cyanosis, Localized] : no localized cyanosis [Petechial Hemorrhages (___cm)] : no petechial hemorrhages [Skin Color & Pigmentation] : normal skin color and pigmentation [] : no rash [No Venous Stasis] : no venous stasis [Skin Lesions] : no skin lesions [No Skin Ulcers] : no skin ulcer [No Xanthoma] : no  xanthoma was observed [Oriented To Time, Place, And Person] : oriented to person, place, and time [Affect] : the affect was normal [Mood] : the mood was normal [No Anxiety] : not feeling anxious [FreeTextEntry1] : Early basal systolic murmur

## 2021-03-25 NOTE — DISCUSSION/SUMMARY
[FreeTextEntry1] : Ms Alcaraz has been noticing persistent dyspnea on exertion and weakness since she had coronavirus about 10 months ago.  She is also noted a worsening of her hypertension.  Her exam shows a BP of 140/60, clear lungs, and early systolic murmur, no evidence of CHF.  An EKG is within normal limits.  She has had dyspnea before, but the cause of her current symptoms is unclear.  She will be scheduled for stress echo.  She will continue on amlodipine 5 mg for the present.

## 2021-04-09 ENCOUNTER — APPOINTMENT (OUTPATIENT)
Dept: CARDIOLOGY | Facility: CLINIC | Age: 86
End: 2021-04-09
Payer: MEDICARE

## 2021-04-09 PROCEDURE — 93351 STRESS TTE COMPLETE: CPT

## 2021-04-09 PROCEDURE — 93320 DOPPLER ECHO COMPLETE: CPT

## 2021-04-09 PROCEDURE — 93325 DOPPLER ECHO COLOR FLOW MAPG: CPT

## 2021-12-22 ENCOUNTER — RX RENEWAL (OUTPATIENT)
Age: 86
End: 2021-12-22

## 2022-07-06 ENCOUNTER — INPATIENT (INPATIENT)
Facility: HOSPITAL | Age: 87
LOS: 1 days | Discharge: HOME HEALTH | DRG: 871 | End: 2022-07-08
Attending: HOSPITALIST | Admitting: STUDENT IN AN ORGANIZED HEALTH CARE EDUCATION/TRAINING PROGRAM
Payer: MEDICARE

## 2022-07-06 VITALS
TEMPERATURE: 98 F | HEART RATE: 100 BPM | HEIGHT: 62 IN | OXYGEN SATURATION: 94 % | SYSTOLIC BLOOD PRESSURE: 136 MMHG | WEIGHT: 138.01 LBS | RESPIRATION RATE: 20 BRPM | DIASTOLIC BLOOD PRESSURE: 85 MMHG

## 2022-07-06 DIAGNOSIS — Z96.649 PRESENCE OF UNSPECIFIED ARTIFICIAL HIP JOINT: Chronic | ICD-10-CM

## 2022-07-06 DIAGNOSIS — J18.9 PNEUMONIA, UNSPECIFIED ORGANISM: ICD-10-CM

## 2022-07-06 DIAGNOSIS — Z90.710 ACQUIRED ABSENCE OF BOTH CERVIX AND UTERUS: Chronic | ICD-10-CM

## 2022-07-06 DIAGNOSIS — Z98.89 OTHER SPECIFIED POSTPROCEDURAL STATES: Chronic | ICD-10-CM

## 2022-07-06 LAB
ALBUMIN SERPL ELPH-MCNC: 4.1 G/DL — SIGNIFICANT CHANGE UP (ref 3.3–5)
ALP SERPL-CCNC: 87 U/L — SIGNIFICANT CHANGE UP (ref 40–120)
ALT FLD-CCNC: 18 U/L — SIGNIFICANT CHANGE UP (ref 10–45)
ANION GAP SERPL CALC-SCNC: 14 MMOL/L — SIGNIFICANT CHANGE UP (ref 5–17)
ANISOCYTOSIS BLD QL: SLIGHT — SIGNIFICANT CHANGE UP
AST SERPL-CCNC: 22 U/L — SIGNIFICANT CHANGE UP (ref 10–40)
BASE EXCESS BLDV CALC-SCNC: -5 MMOL/L — LOW (ref -2–2)
BASOPHILS # BLD AUTO: 0 K/UL — SIGNIFICANT CHANGE UP (ref 0–0.2)
BASOPHILS NFR BLD AUTO: 0 % — SIGNIFICANT CHANGE UP (ref 0–2)
BILIRUB SERPL-MCNC: 1.4 MG/DL — HIGH (ref 0.2–1.2)
BLOOD GAS VENOUS - CREATININE: SIGNIFICANT CHANGE UP MG/DL (ref 0.5–1.3)
BUN SERPL-MCNC: 13 MG/DL — SIGNIFICANT CHANGE UP (ref 7–23)
CA-I SERPL-SCNC: 1.12 MMOL/L — LOW (ref 1.15–1.33)
CALCIUM SERPL-MCNC: 8.6 MG/DL — SIGNIFICANT CHANGE UP (ref 8.4–10.5)
CHLORIDE BLDV-SCNC: 107 MMOL/L — SIGNIFICANT CHANGE UP (ref 96–108)
CHLORIDE SERPL-SCNC: 109 MMOL/L — HIGH (ref 96–108)
CO2 BLDV-SCNC: 20 MMOL/L — LOW (ref 22–26)
CO2 SERPL-SCNC: 17 MMOL/L — LOW (ref 22–31)
CREAT SERPL-MCNC: 0.61 MG/DL — SIGNIFICANT CHANGE UP (ref 0.5–1.3)
DACRYOCYTES BLD QL SMEAR: SLIGHT — SIGNIFICANT CHANGE UP
EGFR: 86 ML/MIN/1.73M2 — SIGNIFICANT CHANGE UP
ELLIPTOCYTES BLD QL SMEAR: SLIGHT — SIGNIFICANT CHANGE UP
EOSINOPHIL # BLD AUTO: 0 K/UL — SIGNIFICANT CHANGE UP (ref 0–0.5)
EOSINOPHIL NFR BLD AUTO: 0 % — SIGNIFICANT CHANGE UP (ref 0–6)
GAS PNL BLDV: 136 MMOL/L — SIGNIFICANT CHANGE UP (ref 136–145)
GAS PNL BLDV: SIGNIFICANT CHANGE UP
GAS PNL BLDV: SIGNIFICANT CHANGE UP
GLUCOSE BLDV-MCNC: 110 MG/DL — HIGH (ref 70–99)
GLUCOSE SERPL-MCNC: 116 MG/DL — HIGH (ref 70–99)
HCO3 BLDV-SCNC: 20 MMOL/L — LOW (ref 22–29)
HCT VFR BLD CALC: 29.1 % — LOW (ref 34.5–45)
HCT VFR BLDA CALC: 28 % — LOW (ref 34.5–46.5)
HGB BLD CALC-MCNC: 9.4 G/DL — LOW (ref 11.7–16.1)
HGB BLD-MCNC: 9.7 G/DL — LOW (ref 11.5–15.5)
LACTATE BLDV-MCNC: 1.5 MMOL/L — SIGNIFICANT CHANGE UP (ref 0.7–2)
LYMPHOCYTES # BLD AUTO: 0.67 K/UL — LOW (ref 1–3.3)
LYMPHOCYTES # BLD AUTO: 3.4 % — LOW (ref 13–44)
MACROCYTES BLD QL: SLIGHT — SIGNIFICANT CHANGE UP
MAGNESIUM SERPL-MCNC: 1.4 MG/DL — LOW (ref 1.6–2.6)
MANUAL SMEAR VERIFICATION: SIGNIFICANT CHANGE UP
MCHC RBC-ENTMCNC: 31.5 PG — SIGNIFICANT CHANGE UP (ref 27–34)
MCHC RBC-ENTMCNC: 33.3 GM/DL — SIGNIFICANT CHANGE UP (ref 32–36)
MCV RBC AUTO: 94.5 FL — SIGNIFICANT CHANGE UP (ref 80–100)
MONOCYTES # BLD AUTO: 0.51 K/UL — SIGNIFICANT CHANGE UP (ref 0–0.9)
MONOCYTES NFR BLD AUTO: 2.6 % — SIGNIFICANT CHANGE UP (ref 2–14)
MYELOCYTES NFR BLD: 0.9 % — HIGH (ref 0–0)
NEUTROPHILS # BLD AUTO: 18.28 K/UL — HIGH (ref 1.8–7.4)
NEUTROPHILS NFR BLD AUTO: 88 % — HIGH (ref 43–77)
NEUTS BAND # BLD: 5.1 % — SIGNIFICANT CHANGE UP (ref 0–8)
NRBC # BLD: 1 /100 — HIGH (ref 0–0)
NT-PROBNP SERPL-SCNC: 2697 PG/ML — HIGH (ref 0–300)
PCO2 BLDV: 33 MMHG — LOW (ref 39–42)
PH BLDV: 7.38 — SIGNIFICANT CHANGE UP (ref 7.32–7.43)
PHOSPHATE SERPL-MCNC: 3 MG/DL — SIGNIFICANT CHANGE UP (ref 2.5–4.5)
PLAT MORPH BLD: NORMAL — SIGNIFICANT CHANGE UP
PLATELET # BLD AUTO: 218 K/UL — SIGNIFICANT CHANGE UP (ref 150–400)
PO2 BLDV: 47 MMHG — HIGH (ref 25–45)
POIKILOCYTOSIS BLD QL AUTO: SLIGHT — SIGNIFICANT CHANGE UP
POLYCHROMASIA BLD QL SMEAR: SLIGHT — SIGNIFICANT CHANGE UP
POTASSIUM BLDV-SCNC: 4.2 MMOL/L — SIGNIFICANT CHANGE UP (ref 3.5–5.1)
POTASSIUM SERPL-MCNC: 4 MMOL/L — SIGNIFICANT CHANGE UP (ref 3.5–5.3)
POTASSIUM SERPL-SCNC: 4 MMOL/L — SIGNIFICANT CHANGE UP (ref 3.5–5.3)
PROT SERPL-MCNC: 6.5 G/DL — SIGNIFICANT CHANGE UP (ref 6–8.3)
RAPID RVP RESULT: SIGNIFICANT CHANGE UP
RBC # BLD: 3.08 M/UL — LOW (ref 3.8–5.2)
RBC # FLD: 21.8 % — HIGH (ref 10.3–14.5)
RBC BLD AUTO: ABNORMAL
SAO2 % BLDV: 77.5 % — SIGNIFICANT CHANGE UP (ref 67–88)
SARS-COV-2 RNA SPEC QL NAA+PROBE: SIGNIFICANT CHANGE UP
SODIUM SERPL-SCNC: 140 MMOL/L — SIGNIFICANT CHANGE UP (ref 135–145)
TARGETS BLD QL SMEAR: SLIGHT — SIGNIFICANT CHANGE UP
TROPONIN T, HIGH SENSITIVITY RESULT: 17 NG/L — SIGNIFICANT CHANGE UP (ref 0–51)
WBC # BLD: 19.64 K/UL — HIGH (ref 3.8–10.5)
WBC # FLD AUTO: 19.64 K/UL — HIGH (ref 3.8–10.5)

## 2022-07-06 PROCEDURE — 99285 EMERGENCY DEPT VISIT HI MDM: CPT | Mod: FS,CS

## 2022-07-06 PROCEDURE — 71045 X-RAY EXAM CHEST 1 VIEW: CPT | Mod: 26

## 2022-07-06 RX ORDER — AZITHROMYCIN 500 MG/1
500 TABLET, FILM COATED ORAL ONCE
Refills: 0 | Status: COMPLETED | OUTPATIENT
Start: 2022-07-06 | End: 2022-07-06

## 2022-07-06 RX ORDER — SODIUM CHLORIDE 9 MG/ML
500 INJECTION INTRAMUSCULAR; INTRAVENOUS; SUBCUTANEOUS ONCE
Refills: 0 | Status: COMPLETED | OUTPATIENT
Start: 2022-07-06 | End: 2022-07-06

## 2022-07-06 RX ORDER — MAGNESIUM SULFATE 500 MG/ML
2 VIAL (ML) INJECTION ONCE
Refills: 0 | Status: COMPLETED | OUTPATIENT
Start: 2022-07-06 | End: 2022-07-06

## 2022-07-06 RX ORDER — ACETAMINOPHEN 500 MG
975 TABLET ORAL ONCE
Refills: 0 | Status: COMPLETED | OUTPATIENT
Start: 2022-07-06 | End: 2022-07-06

## 2022-07-06 RX ORDER — CEFTRIAXONE 500 MG/1
1000 INJECTION, POWDER, FOR SOLUTION INTRAMUSCULAR; INTRAVENOUS ONCE
Refills: 0 | Status: COMPLETED | OUTPATIENT
Start: 2022-07-06 | End: 2022-07-06

## 2022-07-06 RX ORDER — ONDANSETRON 8 MG/1
4 TABLET, FILM COATED ORAL ONCE
Refills: 0 | Status: COMPLETED | OUTPATIENT
Start: 2022-07-06 | End: 2022-07-06

## 2022-07-06 RX ADMIN — AZITHROMYCIN 255 MILLIGRAM(S): 500 TABLET, FILM COATED ORAL at 21:29

## 2022-07-06 RX ADMIN — CEFTRIAXONE 100 MILLIGRAM(S): 500 INJECTION, POWDER, FOR SOLUTION INTRAMUSCULAR; INTRAVENOUS at 21:29

## 2022-07-06 RX ADMIN — Medication 975 MILLIGRAM(S): at 20:40

## 2022-07-06 RX ADMIN — SODIUM CHLORIDE 500 MILLILITER(S): 9 INJECTION INTRAMUSCULAR; INTRAVENOUS; SUBCUTANEOUS at 20:40

## 2022-07-06 NOTE — ED ADULT NURSE REASSESSMENT NOTE - NS ED NURSE REASSESS COMMENT FT1
Report received from JASE Monahan. Pt A+Ox3, VSS, blood work collected and sent to lab. Pt resting comfortably in stretcher, call bell in reach, and aware of plan of care. Pt admitted to medicine for pneumonia; pending admission bed.

## 2022-07-06 NOTE — ED PROVIDER NOTE - NSICDXPASTSURGICALHX_GEN_ALL_CORE_FT
PAST SURGICAL HISTORY:  H/O total hysterectomy     History of hip replacement     S/P spinal surgery

## 2022-07-06 NOTE — ED ADULT NURSE NOTE - OBJECTIVE STATEMENT
pt sent to er for tachycardia  she c/o right arm pain post booster shot and tiredness.  lungs are cleqar and aerating well.  pt is alert and awake.  she states "I am tired and my right arrm hurts"

## 2022-07-06 NOTE — ED PROVIDER NOTE - NS ED ATTENDING STATEMENT MOD
This was a shared visit with the ANTHONY. I reviewed and verified the documentation and independently performed the documented:

## 2022-07-06 NOTE — ED PROVIDER NOTE - PROGRESS NOTE DETAILS
EDDA Tipton: patient vomited but then became hypoxic 85% room air. oxygen sat improved with 5L nasal cannula,. will admit to hospital for RML PNA c/b hypoxia

## 2022-07-06 NOTE — ED PROVIDER NOTE - OBJECTIVE STATEMENT
87 F with PMHx of A fib on Eliquis, and metoprolol presents to the ED c/o cough, fever TMAX 100.8F, SOB, and diarrhea x today. Pt received 4th dose of COVID vaccine last Wednesday and reports hasn't been feeling well since however developed these sx today. Endorsed Tylenol without improvement. 87 F with PMHx of A fib on Eliquis, and metoprolol presents to the ED c/o cough, fever TMAX 100.8F, SOB, and diarrhea x today. Pt received 4th dose of COVID vaccine last Wednesday and reports hasn't been feeling well since however developed these sx today. Endorsed Tylenol without improvement.    Attendinyo female presents with cough and weakness and fatigue for a few days.  fever started today.  has mild shortness of breath.

## 2022-07-06 NOTE — ED PROVIDER NOTE - CLINICAL SUMMARY MEDICAL DECISION MAKING FREE TEXT BOX
Saad David (MD): 87 F presents to the ED c/o cough and URI sx along with SOB. Sx are consistent with infection. Will obtain CXR, viral swab, labs, ekg, and reassess.

## 2022-07-06 NOTE — ED PROVIDER NOTE - NS_ ATTENDINGSCRIBEDETAILS _ED_A_ED_FT
The scribe's documentation has been prepared under my direction and personally reviewed by me in its entirety. I confirm that the note above accurately reflects all work, treatment, procedures, and medical decision making performed by me (Dr. David).

## 2022-07-06 NOTE — ED PROVIDER NOTE - NSICDXPASTMEDICALHX_GEN_ALL_CORE_FT
PAST MEDICAL HISTORY:  Anemia, unspecified type     Chronic midline low back pain without sciatica     Hypertension     Overactive bladder     Primary osteoarthritis of both hips     Uterine leiomyoma, unspecified location

## 2022-07-07 ENCOUNTER — TRANSCRIPTION ENCOUNTER (OUTPATIENT)
Age: 87
End: 2022-07-07

## 2022-07-07 DIAGNOSIS — A41.9 SEPSIS, UNSPECIFIED ORGANISM: ICD-10-CM

## 2022-07-07 DIAGNOSIS — D64.9 ANEMIA, UNSPECIFIED: ICD-10-CM

## 2022-07-07 DIAGNOSIS — Z87.898 PERSONAL HISTORY OF OTHER SPECIFIED CONDITIONS: ICD-10-CM

## 2022-07-07 DIAGNOSIS — J18.9 PNEUMONIA, UNSPECIFIED ORGANISM: ICD-10-CM

## 2022-07-07 DIAGNOSIS — D46.9 MYELODYSPLASTIC SYNDROME, UNSPECIFIED: ICD-10-CM

## 2022-07-07 DIAGNOSIS — N39.0 URINARY TRACT INFECTION, SITE NOT SPECIFIED: ICD-10-CM

## 2022-07-07 DIAGNOSIS — I10 ESSENTIAL (PRIMARY) HYPERTENSION: ICD-10-CM

## 2022-07-07 DIAGNOSIS — Z29.9 ENCOUNTER FOR PROPHYLACTIC MEASURES, UNSPECIFIED: ICD-10-CM

## 2022-07-07 DIAGNOSIS — I48.91 UNSPECIFIED ATRIAL FIBRILLATION: ICD-10-CM

## 2022-07-07 DIAGNOSIS — R09.89 OTHER SPECIFIED SYMPTOMS AND SIGNS INVOLVING THE CIRCULATORY AND RESPIRATORY SYSTEMS: ICD-10-CM

## 2022-07-07 DIAGNOSIS — I48.20 CHRONIC ATRIAL FIBRILLATION, UNSPECIFIED: ICD-10-CM

## 2022-07-07 LAB
ANION GAP SERPL CALC-SCNC: 13 MMOL/L — SIGNIFICANT CHANGE UP (ref 5–17)
APPEARANCE UR: ABNORMAL
BACTERIA # UR AUTO: ABNORMAL
BILIRUB UR-MCNC: NEGATIVE — SIGNIFICANT CHANGE UP
BUN SERPL-MCNC: 13 MG/DL — SIGNIFICANT CHANGE UP (ref 7–23)
CALCIUM SERPL-MCNC: 8 MG/DL — LOW (ref 8.4–10.5)
CHLORIDE SERPL-SCNC: 106 MMOL/L — SIGNIFICANT CHANGE UP (ref 96–108)
CO2 SERPL-SCNC: 18 MMOL/L — LOW (ref 22–31)
COLOR SPEC: YELLOW — SIGNIFICANT CHANGE UP
CREAT SERPL-MCNC: 0.68 MG/DL — SIGNIFICANT CHANGE UP (ref 0.5–1.3)
DIFF PNL FLD: ABNORMAL
EGFR: 84 ML/MIN/1.73M2 — SIGNIFICANT CHANGE UP
EPI CELLS # UR: 1 /HPF — SIGNIFICANT CHANGE UP
GLUCOSE SERPL-MCNC: 123 MG/DL — HIGH (ref 70–99)
GLUCOSE UR QL: NEGATIVE — SIGNIFICANT CHANGE UP
HYALINE CASTS # UR AUTO: 9 /LPF — HIGH (ref 0–2)
KETONES UR-MCNC: NEGATIVE — SIGNIFICANT CHANGE UP
LEGIONELLA AG UR QL: NEGATIVE — SIGNIFICANT CHANGE UP
LEUKOCYTE ESTERASE UR-ACNC: ABNORMAL
MRSA PCR RESULT.: SIGNIFICANT CHANGE UP
NITRITE UR-MCNC: POSITIVE
PH UR: 6 — SIGNIFICANT CHANGE UP (ref 5–8)
POTASSIUM SERPL-MCNC: 3.9 MMOL/L — SIGNIFICANT CHANGE UP (ref 3.5–5.3)
POTASSIUM SERPL-SCNC: 3.9 MMOL/L — SIGNIFICANT CHANGE UP (ref 3.5–5.3)
PROT UR-MCNC: ABNORMAL
RBC CASTS # UR COMP ASSIST: 3 /HPF — SIGNIFICANT CHANGE UP (ref 0–4)
S AUREUS DNA NOSE QL NAA+PROBE: SIGNIFICANT CHANGE UP
SODIUM SERPL-SCNC: 137 MMOL/L — SIGNIFICANT CHANGE UP (ref 135–145)
SP GR SPEC: 1.02 — SIGNIFICANT CHANGE UP (ref 1.01–1.02)
TROPONIN T, HIGH SENSITIVITY RESULT: 20 NG/L — SIGNIFICANT CHANGE UP (ref 0–51)
UROBILINOGEN FLD QL: NEGATIVE — SIGNIFICANT CHANGE UP
WBC UR QL: 61 /HPF — HIGH (ref 0–5)

## 2022-07-07 PROCEDURE — 12345: CPT | Mod: NC,GC

## 2022-07-07 PROCEDURE — 99223 1ST HOSP IP/OBS HIGH 75: CPT | Mod: GC

## 2022-07-07 RX ORDER — AZITHROMYCIN 500 MG/1
500 TABLET, FILM COATED ORAL EVERY 24 HOURS
Refills: 0 | Status: DISCONTINUED | OUTPATIENT
Start: 2022-07-07 | End: 2022-07-08

## 2022-07-07 RX ORDER — APIXABAN 2.5 MG/1
1 TABLET, FILM COATED ORAL
Qty: 0 | Refills: 0 | DISCHARGE

## 2022-07-07 RX ORDER — CEFTRIAXONE 500 MG/1
1000 INJECTION, POWDER, FOR SOLUTION INTRAMUSCULAR; INTRAVENOUS EVERY 24 HOURS
Refills: 0 | Status: DISCONTINUED | OUTPATIENT
Start: 2022-07-07 | End: 2022-07-08

## 2022-07-07 RX ORDER — ONDANSETRON 8 MG/1
4 TABLET, FILM COATED ORAL EVERY 8 HOURS
Refills: 0 | Status: DISCONTINUED | OUTPATIENT
Start: 2022-07-07 | End: 2022-07-08

## 2022-07-07 RX ORDER — APIXABAN 2.5 MG/1
5 TABLET, FILM COATED ORAL
Refills: 0 | Status: DISCONTINUED | OUTPATIENT
Start: 2022-07-07 | End: 2022-07-08

## 2022-07-07 RX ORDER — ENOXAPARIN SODIUM 100 MG/ML
40 INJECTION SUBCUTANEOUS EVERY 24 HOURS
Refills: 0 | Status: DISCONTINUED | OUTPATIENT
Start: 2022-07-07 | End: 2022-07-07

## 2022-07-07 RX ORDER — METOPROLOL TARTRATE 50 MG
50 TABLET ORAL DAILY
Refills: 0 | Status: DISCONTINUED | OUTPATIENT
Start: 2022-07-07 | End: 2022-07-07

## 2022-07-07 RX ORDER — APIXABAN 2.5 MG/1
5 TABLET, FILM COATED ORAL ONCE
Refills: 0 | Status: DISCONTINUED | OUTPATIENT
Start: 2022-07-07 | End: 2022-07-07

## 2022-07-07 RX ORDER — ACETAMINOPHEN 500 MG
650 TABLET ORAL EVERY 6 HOURS
Refills: 0 | Status: DISCONTINUED | OUTPATIENT
Start: 2022-07-07 | End: 2022-07-08

## 2022-07-07 RX ORDER — METOPROLOL TARTRATE 50 MG
50 TABLET ORAL DAILY
Refills: 0 | Status: DISCONTINUED | OUTPATIENT
Start: 2022-07-07 | End: 2022-07-08

## 2022-07-07 RX ADMIN — Medication 100 MILLIGRAM(S): at 13:34

## 2022-07-07 RX ADMIN — Medication 100 MILLIGRAM(S): at 21:18

## 2022-07-07 RX ADMIN — AZITHROMYCIN 255 MILLIGRAM(S): 500 TABLET, FILM COATED ORAL at 05:18

## 2022-07-07 RX ADMIN — Medication 650 MILLIGRAM(S): at 13:55

## 2022-07-07 RX ADMIN — APIXABAN 5 MILLIGRAM(S): 2.5 TABLET, FILM COATED ORAL at 18:55

## 2022-07-07 RX ADMIN — Medication 25 GRAM(S): at 01:37

## 2022-07-07 RX ADMIN — CEFTRIAXONE 100 MILLIGRAM(S): 500 INJECTION, POWDER, FOR SOLUTION INTRAMUSCULAR; INTRAVENOUS at 04:06

## 2022-07-07 RX ADMIN — Medication 650 MILLIGRAM(S): at 13:37

## 2022-07-07 NOTE — PROGRESS NOTE ADULT - PROBLEM SELECTOR PLAN 4
Patient experienced intermittent 8/10 substernal chest pain without radiating and without known provocation. Ddx: costochondritis, MI, cough-induced, acid-reflux.   -Cardiac troponin are unremarkable  -Not currently experiencing chest pain  -Follow up ECG  -Continue to monitor for now

## 2022-07-07 NOTE — H&P ADULT - PROBLEM SELECTOR PROBLEM 5
How Severe Is Your Skin Lesion?: mild Have Your Skin Lesions Been Treated?: not been treated Is This A New Presentation, Or A Follow-Up?: Skin Lesions Afib Chronic atrial fibrillation

## 2022-07-07 NOTE — H&P ADULT - PROBLEM SELECTOR PLAN 4
Patient experienced intermittent 8/10 substernal chest pain without radiating and without known provocation. Ddx: costochondritis, MI, cough-induced, acid-reflux.   -Cardiac troponin are unremarkable  -Not currently experiencing chest pain  -Continue to monitor for now Patient experienced intermittent 8/10 substernal chest pain without radiating and without known provocation. Ddx: costochondritis, MI, cough-induced, acid-reflux.   -Cardiac troponin are unremarkable  -Not currently experiencing chest pain  -Follow up ECG  -Continue to monitor for now

## 2022-07-07 NOTE — H&P ADULT - NSHPPHYSICALEXAM_GEN_ALL_CORE
PHYSICAL EXAM:  GENERAL: NAD, lying in bed comfortably  HEAD:  Atraumatic, Normocephalic  EYES: EOMI, PERRLA, conjunctiva and sclera clear  ENT: Moist mucous membranes  NECK: Supple, No JVD; no palpable pre-auricular, post-auricular, occipital, mandibular, submental, supra-clavicular, or infra-clavicular lymph nodes   CHEST/LUNG: Clear to auscultation bilaterally; No rales, rhonchi, wheezing, or rubs. Unlabored respirations  HEART: Regular rate and rhythm; No murmurs, rubs, or gallops  ABDOMEN: Bowel sounds present; Soft, Nontender, Nondistended. No hepatomegaly  EXTREMITIES:  2+ Peripheral Pulses, brisk capillary refill. No clubbing, cyanosis, or edema  NERVOUS SYSTEM:  Alert & Oriented X3, speech clear. No deficits   MSK: FROM all 4 extremities, full and equal strength  SKIN: No rashes or lesions Vital Signs Last 24 Hrs  T(C): 36.3 (07 Jul 2022 00:22), Max: 36.8 (06 Jul 2022 16:15)  T(F): 97.4 (07 Jul 2022 00:22), Max: 98.3 (06 Jul 2022 16:15)  HR: 78 (07 Jul 2022 00:22) (78 - 100)  BP: 121/50 (07 Jul 2022 00:22) (121/50 - 136/85)  BP(mean): --  RR: 20 (07 Jul 2022 00:22) (20 - 22)  SpO2: 100% (07 Jul 2022 00:22) (85% - 100%)    PHYSICAL EXAM:  GENERAL: NAD, lying in bed comfortably  HEAD:  Atraumatic, Normocephalic  EYES: EOMI, PERRLA, conjunctiva and sclera clear  ENT: Moist mucous membranes  NECK: Supple, No JVD; no palpable pre-auricular, post-auricular, occipital, mandibular, submental, supra-clavicular, or infra-clavicular lymph nodes   CHEST/LUNG: Clear to auscultation bilaterally; No rales, rhonchi, wheezing, or rubs. Unlabored respirations  HEART: Regular rate and rhythm; No murmurs, rubs, or gallops  ABDOMEN: Bowel sounds present; Soft, Nontender, Nondistended. No hepatomegaly  EXTREMITIES:  2+ Peripheral Pulses, brisk capillary refill. No clubbing, cyanosis, or edema  NERVOUS SYSTEM:  Alert & Oriented X3, speech clear. No deficits   MSK: FROM all 4 extremities, full and equal strength  SKIN: No rashes or lesions Vital Signs Last 24 Hrs  T(C): 36.3 (07 Jul 2022 00:22), Max: 36.8 (06 Jul 2022 16:15)  T(F): 97.4 (07 Jul 2022 00:22), Max: 98.3 (06 Jul 2022 16:15)  HR: 78 (07 Jul 2022 00:22) (78 - 100)  BP: 121/50 (07 Jul 2022 00:22) (121/50 - 136/85)  BP(mean): --  RR: 20 (07 Jul 2022 00:22) (20 - 22)  SpO2: 100% (07 Jul 2022 00:22) (85% - 100%)    PHYSICAL EXAM:  GENERAL: NAD, lying in bed comfortably  HEAD:  Atraumatic, Normocephalic  EYES: EOMI, PERRLA, conjunctiva and sclera clear  ENT: Moist mucous membranes  NECK: Supple, No JVD; no palpable pre-auricular, post-auricular, occipital, mandibular, submental, supra-clavicular, or infra-clavicular lymph nodes   CHEST/LUNG: R sided rales, no wheezing, On 2L O2,   HEART: Regular rate and rhythm; No murmurs, rubs, or gallops  ABDOMEN: Bowel sounds present; Soft, Nontender, Nondistended. No hepatomegaly  EXTREMITIES:  2+ Peripheral Pulses, brisk capillary refill. No clubbing, cyanosis, or edema  NERVOUS SYSTEM:  Alert & Oriented X3, speech clear. No deficits, strength intact, no sensory deficits    MSK: FROM all 4 extremities, full and equal strength  SKIN: No rashes or lesions Vital Signs Last 24 Hrs  T(C): 36.3 (07 Jul 2022 00:22), Max: 36.8 (06 Jul 2022 16:15)  T(F): 97.4 (07 Jul 2022 00:22), Max: 98.3 (06 Jul 2022 16:15)  HR: 78 (07 Jul 2022 00:22) (78 - 100)  BP: 121/50 (07 Jul 2022 00:22) (121/50 - 136/85)  BP(mean): --  RR: 20 (07 Jul 2022 00:22) (20 - 22)  SpO2: 100% (07 Jul 2022 00:22) (85% - 100%)    PHYSICAL EXAM:  GENERAL: NAD, lying in bed comfortably  HEAD:  Atraumatic, Normocephalic appearing  EYES: Sclera clear  NECK: Supple, no palpable pre-auricular, post-auricular, occipital, mandibular, submental, supra-clavicular, or infra-clavicular lymph nodes; has lipoma near right side of neck near clavicular area   CHEST/LUNG: R sided rales, no wheezing, On 2L O2,   HEART: irregularly irregular rate and rhythm; No murmurs, rubs, or gallops appreciated  ABDOMEN: Soft, Nontender to light and deep palpation, Nondistended.  EXTREMITIES:  2+ Peripheral radial pulses. No LE edema bilaterally  NERVOUS SYSTEM: Alert & oriented to situation. No gross deficits, strength intact, no sensory deficits    PSYCH: Appropriate affect  SKIN: No obvious rashes

## 2022-07-07 NOTE — ED ADULT NURSE REASSESSMENT NOTE - NS ED NURSE REASSESS COMMENT FT1
Pt A+Ox3, VSS, medication administered. Pt resting in stretcher, call bell in reach, pending admission bed.

## 2022-07-07 NOTE — PROGRESS NOTE ADULT - SUBJECTIVE AND OBJECTIVE BOX
DATE OF SERVICE: 22 @ 06:15    Patient is a 87y old  Female who presents with a chief complaint of pneumonia (2022 00:44)      SUBJECTIVE / OVERNIGHT EVENTS:    MEDICATIONS  (STANDING):  apixaban 5 milliGRAM(s) Oral once  azithromycin  IVPB 500 milliGRAM(s) IV Intermittent every 24 hours  cefTRIAXone   IVPB 1000 milliGRAM(s) IV Intermittent every 24 hours  metoprolol succinate ER 50 milliGRAM(s) Oral daily    MEDICATIONS  (PRN):  acetaminophen     Tablet .. 650 milliGRAM(s) Oral every 6 hours PRN Temp greater or equal to 38C (100.4F), Mild Pain (1 - 3), Moderate Pain (4 - 6)  ondansetron Injectable 4 milliGRAM(s) IV Push every 8 hours PRN Nausea and/or Vomiting      Vital Signs Last 24 Hrs  T(C): 36.3 (2022 00:22), Max: 36.8 (2022 16:15)  T(F): 97.4 (2022 00:22), Max: 98.3 (2022 16:15)  HR: 86 (2022 01:51) (78 - 100)  BP: 101/60 (2022 01:51) (101/60 - 136/85)  BP(mean): --  RR: 20 (2022 01:51) (20 - 22)  SpO2: 100% (2022 01:51) (85% - 100%)  CAPILLARY BLOOD GLUCOSE        I&O's Summary      PHYSICAL EXAM:  GENERAL: NAD, well-developed  HEAD:  Atraumatic, Normocephalic  EYES: EOMI, PERRLA, conjunctiva and sclera clear  NECK: Supple, No JVD  CHEST/LUNG: Clear to auscultation bilaterally; No wheeze  HEART: Regular rate and rhythm; No murmurs, rubs, or gallops  ABDOMEN: Soft, Nontender, Nondistended; Bowel sounds present  EXTREMITIES:  2+ Peripheral Pulses, No clubbing, cyanosis, or edema  PSYCH: AAOx3  NEUROLOGY: non-focal  SKIN: No rashes or lesions    LABS:                        9.7    19.64 )-----------( 218      ( 2022 20:50 )             29.1     07-07    137  |  106  |  13  ----------------------------<  123<H>  3.9   |  18<L>  |  0.68    Ca    8.0<L>      2022 00:13  Phos  3.0     -  Mg     1.4         TPro  6.5  /  Alb  4.1  /  TBili  1.4<H>  /  DBili  x   /  AST  22  /  ALT  18  /  AlkPhos  87            Urinalysis Basic - ( 2022 01:55 )    Color: Yellow / Appearance: Slightly Turbid / S.022 / pH: x  Gluc: x / Ketone: Negative  / Bili: Negative / Urobili: Negative   Blood: x / Protein: 30 mg/dL / Nitrite: Positive   Leuk Esterase: Large / RBC: 3 /hpf / WBC 61 /HPF   Sq Epi: x / Non Sq Epi: 1 /hpf / Bacteria: Moderate        RADIOLOGY & ADDITIONAL TESTS:    Imaging Personally Reviewed:    Consultant(s) Notes Reviewed:      Care Discussed with Consultants/Other Providers:   DATE OF SERVICE: 22 @ 06:15    Patient is a 87y old  Female who presents with a chief complaint of pneumonia (2022 00:44)      SUBJECTIVE / OVERNIGHT EVENTS: NAOE. Patient continues to endorse shortness of breath, but improved since yesterday. She states she can tolerate being off nasal canula. Continues to endorse unchanged coughing, which worsens dyspnea. Patient endorses mild bifrontal headache since yesterday. Otherwise, patient endorses good appetite.    Patient endorses gluten allergy    INCOMPLETE    MEDICATIONS  (STANDING):  apixaban 5 milliGRAM(s) BID  azithromycin  IVPB 500 milliGRAM(s) IV Intermittent every 24 hours  cefTRIAXone   IVPB 1000 milliGRAM(s) IV Intermittent every 24 hours  metoprolol succinate ER 50 milliGRAM(s) Oral daily    MEDICATIONS  (PRN):  acetaminophen     Tablet .. 650 milliGRAM(s) Oral every 6 hours PRN Temp greater or equal to 38C (100.4F), Mild Pain (1 - 3), Moderate Pain (4 - 6)  ondansetron Injectable 4 milliGRAM(s) IV Push every 8 hours PRN Nausea and/or Vomiting      Vital Signs Last 24 Hrs  T(C): 36.3 (2022 00:22), Max: 36.8 (2022 16:15)  T(F): 97.4 (2022 00:22), Max: 98.3 (2022 16:15)  HR: 86 (2022 01:51) (78 - 100)  BP: 101/60 (2022 01:51) (101/60 - 136/85)  BP(mean): --  RR: 20 (2022 01:51) (20 - 22)  SpO2: 100% (2022 01:51) (85% - 100%)  CAPILLARY BLOOD GLUCOSE        I&O's Summary      PHYSICAL EXAM:  GENERAL: NAD, well-developed  HEAD:  Atraumatic, Normocephalic  EYES: EOMI, PERRLA, conjunctiva and sclera clear  NECK: Supple, No JVD  CHEST/LUNG: Clear to auscultation bilaterally; No wheeze  HEART: Regular rate and rhythm; No murmurs, rubs, or gallops  ABDOMEN: Soft, Nontender, Nondistended; Bowel sounds present  EXTREMITIES:  2+ Peripheral Pulses, No clubbing, cyanosis, or edema  PSYCH: AAOx3  NEUROLOGY: non-focal  SKIN: No rashes or lesions    LABS:                        9.7    19.64 )-----------( 218      ( 2022 20:50 )             29.1     07-    137  |  106  |  13  ----------------------------<  123<H>  3.9   |  18<L>  |  0.68    Ca    8.0<L>      2022 00:13  Phos  3.0     07-  Mg     1.4     -    TPro  6.5  /  Alb  4.1  /  TBili  1.4<H>  /  DBili  x   /  AST  22  /  ALT  18  /  AlkPhos  87  -          Urinalysis Basic - ( 2022 01:55 )    Color: Yellow / Appearance: Slightly Turbid / S.022 / pH: x  Gluc: x / Ketone: Negative  / Bili: Negative / Urobili: Negative   Blood: x / Protein: 30 mg/dL / Nitrite: Positive   Leuk Esterase: Large / RBC: 3 /hpf / WBC 61 /HPF   Sq Epi: x / Non Sq Epi: 1 /hpf / Bacteria: Moderate        RADIOLOGY & ADDITIONAL TESTS:    Imaging Personally Reviewed:    Consultant(s) Notes Reviewed:      Care Discussed with Consultants/Other Providers:   DATE OF SERVICE: 22 @ 06:15    Patient is a 87y old  Female who presents with a chief complaint of pneumonia (2022 00:44)      SUBJECTIVE / OVERNIGHT EVENTS: NAOE. Patient continues to endorse shortness of breath, but improved since yesterday. She states she can tolerate being off nasal canula. Continues to endorse unchanged coughing, which worsens dyspnea. Patient endorses mild bifrontal headache since yesterday. Otherwise, patient endorses good appetite.    Patient endorses gluten allergy.    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 Follows with Dr. Pineda hematologist      MEDICATIONS  (STANDING):  apixaban 5 milliGRAM(s) BID  azithromycin  IVPB 500 milliGRAM(s) IV Intermittent every 24 hours  cefTRIAXone   IVPB 1000 milliGRAM(s) IV Intermittent every 24 hours  metoprolol succinate ER 50 milliGRAM(s) Oral daily    MEDICATIONS  (PRN):  acetaminophen     Tablet .. 650 milliGRAM(s) Oral every 6 hours PRN Temp greater or equal to 38C (100.4F), Mild Pain (1 - 3), Moderate Pain (4 - 6)  ondansetron Injectable 4 milliGRAM(s) IV Push every 8 hours PRN Nausea and/or Vomiting      Vital Signs Last 24 Hrs  T(C): 36.3 (2022 00:22), Max: 36.8 (2022 16:15)  T(F): 97.4 (2022 00:22), Max: 98.3 (2022 16:15)  HR: 86 (2022 01:51) (78 - 100)  BP: 101/60 (2022 01:51) (101/60 - 136/85)  BP(mean): --  RR: 20 (2022 01:51) (20 - 22)  SpO2: 100% (2022 01:51) (85% - 100%)  CAPILLARY BLOOD GLUCOSE        I&O's Summary      PHYSICAL EXAM:  GENERAL: NAD, well-developed  HEAD:  Atraumatic, Normocephalic  EYES: EOMI, conjunctival injection with serous discharge  NECK: Supple, No JVD  CHEST/LUNG: Scattered expiratory wheezes b/l improved with coughing. Crackles in right basilar segment  HEART: Irregular rate; No murmurs, rubs, or gallops  ABDOMEN: Soft, Nontender, Nondistended; Bowel sounds present  EXTREMITIES:  No clubbing, cyanosis, or edema  PSYCH: euthymic  NEUROLOGY: A&Ox3 non-focal  SKIN: No rashes or lesions    LABS:                        9.7    19.64 )-----------( 218      ( 2022 20:50 )             29.1     07-    137  |  106  |  13  ----------------------------<  123<H>  3.9   |  18<L>  |  0.68    Ca    8.0<L>      2022 00:13  Phos  3.0     07-  Mg     1.4     07-    TPro  6.5  /  Alb  4.1  /  TBili  1.4<H>  /  DBili  x   /  AST  22  /  ALT  18  /  AlkPhos  87  07-06          Urinalysis Basic - ( 2022 01:55 )    Color: Yellow / Appearance: Slightly Turbid / S.022 / pH: x  Gluc: x / Ketone: Negative  / Bili: Negative / Urobili: Negative   Blood: x / Protein: 30 mg/dL / Nitrite: Positive   Leuk Esterase: Large / RBC: 3 /hpf / WBC 61 /HPF   Sq Epi: x / Non Sq Epi: 1 /hpf / Bacteria: Moderate        RADIOLOGY & ADDITIONAL TESTS:    Imaging Personally Reviewed:    Consultant(s) Notes Reviewed:      Care Discussed with Consultants/Other Providers:

## 2022-07-07 NOTE — H&P ADULT - NSHPLABSRESULTS_GEN_ALL_CORE
LABS:                        9.7    19.64 )-----------( 218      ( 06 Jul 2022 20:50 )             29.1     07-06    140  |  109<H>  |  13  ----------------------------<  116<H>  4.0   |  17<L>  |  0.61    Ca    8.6      06 Jul 2022 20:50  Phos  3.0     07-06  Mg     1.4     07-06    TPro  6.5  /  Alb  4.1  /  TBili  1.4<H>  /  DBili  x   /  AST  22  /  ALT  18  /  AlkPhos  87  07-06    ----------------------------  RADIOL LABS:                        9.7    19.64 )-----------( 218      ( 06 Jul 2022 20:50 )             29.1     07-06    140  |  109<H>  |  13  ----------------------------<  116<H>  4.0   |  17<L>  |  0.61    Ca    8.6      06 Jul 2022 20:50  Phos  3.0     07-06  Mg     1.4     07-06    TPro  6.5  /  Alb  4.1  /  TBili  1.4<H>  /  DBili  x   /  AST  22  /  ALT  18  /  AlkPhos  87  07-06    ----------------------------  RADIOLOGY    < from: Xray Chest 1 View AP/PA (07.06.22 @ 20:57) >    FINDINGS:    Right midlung consolidation.  No pleural effusion or pneumothorax.  Heart size is within normal limits.  No acute abnormality within visible osseous structures.      IMPRESSION:    Right midlung pneumonia.    < end of copied text > LABS:                        9.7    19.64 )-----------( 218      ( 2022 20:50 )             29.1     07-06    140  |  109<H>  |  13  ----------------------------<  116<H>  4.0   |  17<L>  |  0.61    Ca    8.6      2022 20:50  Phos  3.0     07-  Mg     1.4     07-06    TPro  6.5  /  Alb  4.1  /  TBili  1.4<H>  /  DBili  x   /  AST  22  /  ALT  18  /  AlkPhos  87  07-06  -----------------------------  Urinalysis Basic - ( 2022 01:55 )    Color: Yellow / Appearance: Slightly Turbid / S.022 / pH: x  Gluc: x / Ketone: Negative  / Bili: Negative / Urobili: Negative   Blood: x / Protein: 30 mg/dL / Nitrite: Positive   Leuk Esterase: Large / RBC: 3 /hpf / WBC 61 /HPF   Sq Epi: x / Non Sq Epi: 1 /hpf / Bacteria: Moderate    ----------------------------  RADIOLOGY    < from: Xray Chest 1 View AP/PA (22 @ 20:57) >    FINDINGS:    Right midlung consolidation.  No pleural effusion or pneumothorax.  Heart size is within normal limits.  No acute abnormality within visible osseous structures.      IMPRESSION:    Right midlung pneumonia.    Labs and images reviewed Labs, imaging and EKG personally reviewed and interpreted by me.   labs notable for leukocytosis ~20 with left shift 5% bands, anemia with Hb 9 (near baseline), normal lytes and Cr.   Imaging personally reviewed and interpreted by me - CXR: RML consolidation  EKG personally reviewed and interpreted by me - Afib , no acute ischemic changes,                          9.7    19.64 )-----------( 218      ( 2022 20:50 )             29.1     07-    140  |  109<H>  |  13  ----------------------------<  116<H>  4.0   |  17<L>  |  0.61    Ca    8.6      2022 20:50  Phos  3.0     07-  Mg     1.4     07-    TPro  6.5  /  Alb  4.1  /  TBili  1.4<H>  /  DBili  x   /  AST  22  /  ALT  18  /  AlkPhos  87  -06  -----------------------------  Urinalysis Basic - ( 2022 01:55 )    Color: Yellow / Appearance: Slightly Turbid / S.022 / pH: x  Gluc: x / Ketone: Negative  / Bili: Negative / Urobili: Negative   Blood: x / Protein: 30 mg/dL / Nitrite: Positive   Leuk Esterase: Large / RBC: 3 /hpf / WBC 61 /HPF   Sq Epi: x / Non Sq Epi: 1 /hpf / Bacteria: Moderate    ----------------------------  RADIOLOGY    < from: Xray Chest 1 View AP/PA (22 @ 20:57) >    FINDINGS:    Right midlung consolidation.  No pleural effusion or pneumothorax.  Heart size is within normal limits.  No acute abnormality within visible osseous structures.      IMPRESSION:    Right midlung pneumonia.    Labs and images reviewed

## 2022-07-07 NOTE — PHYSICAL THERAPY INITIAL EVALUATION ADULT - PERTINENT HX OF CURRENT PROBLEM, REHAB EVAL
Pt is an 88 y/o female admited with pneumonia. PMH: Afib on eliquis, myelodysplastic syndrome, HTN. Pt presentes with fever, headache, coughing, SOB, nausea, palpitations, dry heaves and substernal chest pain x1 day. CXR shows right midlung pneumonia. Pt being treated for sepsis, UTI.

## 2022-07-07 NOTE — DISCHARGE NOTE PROVIDER - NSDCMRMEDTOKEN_GEN_ALL_CORE_FT
Eliquis 5 mg oral tablet: 1 tab(s) orally 2 times a day  metoprolol succinate 50 mg oral tablet, extended release: 1 tab(s) orally once a day   benzonatate 100 mg oral capsule: 1 cap(s) orally 3 times a day  cefpodoxime 200 mg oral tablet: 1 tab(s) orally 2 times a day starting on 7/9/2022 until 7/12/2022  Eliquis 5 mg oral tablet: 1 tab(s) orally 2 times a day  metoprolol succinate 50 mg oral tablet, extended release: 1 tab(s) orally once a day

## 2022-07-07 NOTE — H&P ADULT - NSHPREVIEWOFSYSTEMS_GEN_ALL_CORE
REVIEW OF SYSTEMS: As indicated above; otherwise negative    CONSTITUTIONAL: No weakness, fevers or chills  EYES/ENT: No visual changes; no vertigo or throat pain   NECK: No pain or stiffness  RESPIRATORY: No cough, wheezing, hemoptysis; No shortness of breath  CARDIOVASCULAR: No chest pain or palpitations  GASTROINTESTINAL: No abdominal or epigastric pain. No nausea, vomiting, or hematemesis; no diarrhea or constipation; no melena or hematochezia.  GENITOURINARY: No dysuria, frequency or hematuria  NEUROLOGICAL: No numbness or weakness  SKIN: No itching, rashes REVIEW OF SYSTEMS: As indicated above; otherwise negative    CONSTITUTIONAL: +weakness, +fevers or chills  EYES/ENT: No visual changes; no vertigo or throat pain   NECK: No pain or stiffness  RESPIRATORY: +cough, wheezing, hemoptysis; +shortness of breath  CARDIOVASCULAR: +chest pain or palpitations  GASTROINTESTINAL: No abdominal or epigastric pain. +nausea, +vomiting, no hematemesis; +diarrhea no constipation; no melena or hematochezia.  GENITOURINARY: +dysuria, frequency or hematuria  NEUROLOGICAL: No numbness or weakness  SKIN: No itching, rashes

## 2022-07-07 NOTE — PROGRESS NOTE ADULT - PROBLEM SELECTOR PLAN 2
Acute onset fever, dry coughing, and shortness of breath in conjunction with CXR indicating right mid-lobe consolidation likely secondary to pneumonia. Additionally, given her fairly recent GI symptoms, i.e. nausea, dry heaves, and 1-week of diarrhea, would consider legionella pneumonia.  -Follow up blood cultures  -Supplemental oxygen PRN  -Continue antibiotics as above  -Monitor oxygen saturation Acute onset fever, dry coughing, and shortness of breath in conjunction with CXR indicating right mid-lobe consolidation likely secondary to pneumonia. Additionally, given her fairly recent GI symptoms, i.e. nausea, dry heaves, and 1-week of diarrhea, would consider legionella pneumonia.  -Follow up blood cultures  -Supplemental oxygen PRN, currently on RA  -Continue antibiotics as above  -Monitor oxygen saturation

## 2022-07-07 NOTE — H&P ADULT - PROBLEM SELECTOR PLAN 8
Known history of anemia with hgb ~9.7 may be from known history myelodysplastic syndrome  -Follow up iron studies

## 2022-07-07 NOTE — PATIENT PROFILE ADULT - FALL HARM RISK - HARM RISK INTERVENTIONS
Assistance with ambulation/Assistance OOB with selected safe patient handling equipment/Communicate Risk of Fall with Harm to all staff/Monitor gait and stability/Reinforce activity limits and safety measures with patient and family/Review medications for side effects contributing to fall risk/Tailored Fall Risk Interventions/Visual Cue: Yellow wristband and red socks/Bed in lowest position, wheels locked, appropriate side rails in place/Call bell, personal items and telephone in reach/Instruct patient to call for assistance before getting out of bed or chair/Non-slip footwear when patient is out of bed/[place] to call system/Physically safe environment - no spills, clutter or unnecessary equipment/Purposeful Proactive Rounding/Room/bathroom lighting operational, light cord in reach

## 2022-07-07 NOTE — PHYSICAL THERAPY INITIAL EVALUATION ADULT - ADDITIONAL COMMENTS
Pt lives in a PH +3 steps to enter +HR and +6 steps up/down. Pt was driving, shopping, performing all ADLs and ambulating independently prior to admission.

## 2022-07-07 NOTE — H&P ADULT - NSICDXPASTMEDICALHX_GEN_ALL_CORE_FT
PAST MEDICAL HISTORY:  Anemia, unspecified type     Chronic midline low back pain without sciatica     Hypertension     Overactive bladder     Primary osteoarthritis of both hips     Uterine leiomyoma, unspecified location      PAST MEDICAL HISTORY:  Afib     Anemia, unspecified type     Chronic midline low back pain without sciatica     COVID     Hypertension     Myelodysplastic syndrome     Overactive bladder     Primary osteoarthritis of both hips     Uterine leiomyoma, unspecified location

## 2022-07-07 NOTE — H&P ADULT - ASSESSMENT
87 year-old female PMH Afib on Eliquis, myelodysplastic syndrome, HTN on metoprolol presenting acute onset fever, coughing, dyspnea with pneumonia on CXR and UA positive for UTI concerning for sepsis.

## 2022-07-07 NOTE — DISCHARGE NOTE PROVIDER - NSDCCPCAREPLAN_GEN_ALL_CORE_FT
PRINCIPAL DISCHARGE DIAGNOSIS  Diagnosis: Pneumonia  Assessment and Plan of Treatment: You came to the hospital with coughing and shortness of breath and were found to have pneumonia. Pneumonia is a lung infection that can cause coughing, fever, and trouble breathing. The lung infection is often caused by bacteria, but it can also be caused by viruses or other germs.  Doctors treat community-acquired pneumonia with antibiotic medicines. These medicines kill the germs that are causing the infection. Most people can take antibiotic pills at home, but some people need to be treated in the hospital. People who are treated in the hospital usually get antibiotics through a thin tube that goes into their vein, called an "IV." Some people who are treated in the hospital also get extra oxygen to help them breathe more easily.  Most people start to feel better within 3 to 5 days of taking their medicine. But a cough from pneumonia can last weeks or months after treatment. If your symptoms do not improve or get worse after starting treatment, tell your doctor or nurse.  You were given the antibiotics ceftriaxone and azithromycin. On discharge, you will be given _____. Please continue to take these medications.  Please seek medical attention if you have worsening shortness of breath or persistent fevers (Temperature greater than 100.4F)        SECONDARY DISCHARGE DIAGNOSES  Diagnosis: UTI (urinary tract infection)  Assessment and Plan of Treatment: You were also found to have a urinary tract infection. Urinary tract infections, also called "UTIs," are infections that affect either the bladder or the kidneys  Most urinary tract infections are treated with antibiotic pills. These pills work by killing the germs that cause the infection.  If you have a bladder infection, you will probably need to take antibiotics for 3 to 7 days.    Diagnosis: Chronic atrial fibrillation  Assessment and Plan of Treatment:     Diagnosis: Hypertension  Assessment and Plan of Treatment:      PRINCIPAL DISCHARGE DIAGNOSIS  Diagnosis: Pneumonia  Assessment and Plan of Treatment: You came to the hospital with coughing and shortness of breath and were found to have pneumonia. Pneumonia is a lung infection that can cause coughing, fever, and trouble breathing. The lung infection is often caused by bacteria, but it can also be caused by viruses or other germs.  Doctors treat community-acquired pneumonia with antibiotic medicines. These medicines kill the germs that are causing the infection. Most people can take antibiotic pills at home, but some people need to be treated in the hospital. People who are treated in the hospital usually get antibiotics through a thin tube that goes into their vein, called an "IV." Some people who are treated in the hospital also get extra oxygen to help them breathe more easily.  Most people start to feel better within 3 to 5 days of taking their medicine. But a cough from pneumonia can last weeks or months after treatment. If your symptoms do not improve or get worse after starting treatment, tell your doctor or nurse.  You were given the antibiotics ceftriaxone and azithromycin. On discharge, you will be given cefpodoxime 200mg twice daily. Please continue to take these medications.  Please seek medical attention if you have worsening shortness of breath or persistent fevers (Temperature greater than 100.4F)        SECONDARY DISCHARGE DIAGNOSES  Diagnosis: UTI (urinary tract infection)  Assessment and Plan of Treatment: You were also found to have a urinary tract infection. Urinary tract infections, also called "UTIs," are infections that affect either the bladder or the kidneys  Most urinary tract infections are treated with antibiotic pills. These pills work by killing the germs that cause the infection.  If you have a bladder infection, you will probably need to take antibiotics for 3 to 7 days.    Diagnosis: Chronic atrial fibrillation  Assessment and Plan of Treatment: Atrial fibrillation is the most common heart rhythm problem (figure 1). The condition puts you at risk of stroke and other problems as well as death. Atrial fibrillation is sometimes called "A-fib."  The top 2 chambers of your heart are called the "atria." They pump blood into the larger bottom chambers, which pump blood to your lungs and the rest of your body. In A-fib, your heart beats abnormally and the top chambers stop pumping blood as strongly as normal.  Please continue taking your medications for atrial fibrillation, Eliquis (apixiban) and Toprol XL (metoprolol succinate). Please take Eliquis 5mg twice daily and metoprolol succinate 50mg once daily    Diagnosis: Hypertension  Assessment and Plan of Treatment: High blood pressure is a condition that puts you at risk for heart attack, stroke, and kidney disease. It does not usually cause symptoms. But it can be serious.  When your doctor or nurse tells you your blood pressure, they will say 2 numbers. For instance, your doctor or nurse might say that your blood pressure is "130 over 80." The top number is the pressure inside your arteries when your heart is melia. The bottom number is the pressure inside your arteries when your heart is relaxed.  "Elevated blood pressure" is a term doctors or nurses use as a warning. People with elevated blood pressure do not yet have high blood pressure. But their blood pressure is not as low as it should be for good health.  Many experts define high, elevated, and normal blood pressure as follows:  -High – Top number of 130 or above and/or bottom number of 80 or above.  -Elevated – Top number between 120 and 129 and bottom number of 79 or below.  -Normal – Top number of 119 or below and bottom number of 79 or below.  Please continue to take metoprolol, which is used to treat both atrial fibrillation and hypertension    Diagnosis: Swelling around both eyes  Assessment and Plan of Treatment: Please continue to apply hot packs as needed. Please contact your doctor if the swelling becomes worse or you have changes to vision     PRINCIPAL DISCHARGE DIAGNOSIS  Diagnosis: Pneumonia  Assessment and Plan of Treatment: You came to the hospital with coughing and shortness of breath and were found to have pneumonia. Pneumonia is a lung infection that can cause coughing, fever, and trouble breathing. The lung infection is often caused by bacteria, but it can also be caused by viruses or other germs.     Doctors treat community-acquired pneumonia with antibiotic medicines. These medicines kill the germs that are causing the infection. Most people can take antibiotic pills at home, but some people need to be treated in the hospital. People who are treated in the hospital usually get antibiotics through a thin tube that goes into their vein, called an "IV." Some people who are treated in the hospital also get extra oxygen to help them breathe more easily.  Most people start to feel better within 3 to 5 days of taking their medicine. But a cough from pneumonia can last weeks or months after treatment. If your symptoms do not improve or get worse after starting treatment, tell your doctor or nurse.  You were given the antibiotics ceftriaxone and azithromycin. On discharge, you will be given cefpodoxime 200mg twice daily. Please continue to take these medications.  Please seek medical attention if you have worsening shortness of breath or persistent fevers (Temperature greater than 100.4F)      SECONDARY DISCHARGE DIAGNOSES  Diagnosis: UTI (urinary tract infection)  Assessment and Plan of Treatment: You were also found to have a urinary tract infection. Urinary tract infections, also called "UTIs," are infections that affect either the bladder or the kidneys     Most urinary tract infections are treated with antibiotic pills. These pills work by killing the germs that cause the infection. You will need to take antibiotics for 3 to 7 days.    Diagnosis: Chronic atrial fibrillation  Assessment and Plan of Treatment: Atrial fibrillation is the most common heart rhythm problem where your heart beats irregularly. The condition puts you at risk of stroke and other problems as well as death.    The top 2 chambers of your heart are called the "atria." They pump blood into the larger bottom chambers, which pump blood to your lungs and the rest of your body. In A-fib, your heart beats abnormally and the top chambers stop pumping blood as strongly as normal.  Please continue taking your medications for atrial fibrillation, Eliquis (apixiban) and Toprol XL (metoprolol succinate). Please take Eliquis 5mg twice daily and metoprolol succinate 50mg once daily.    Diagnosis: Hypertension  Assessment and Plan of Treatment: High blood pressure is a condition that puts you at risk for heart attack, stroke, and kidney disease. It does not usually cause symptoms. But it can be serious.  When your doctor or nurse tells you your blood pressure, they will say 2 numbers. For instance, your doctor or nurse might say that your blood pressure is "130 over 80." The top number is the pressure inside your arteries when your heart is melia. The bottom number is the pressure inside your arteries when your heart is relaxed.  "Elevated blood pressure" is a term doctors or nurses use as a warning. People with elevated blood pressure do not yet have high blood pressure. But their blood pressure is not as low as it should be for good health.  Many experts define high, elevated, and normal blood pressure as follows:  -High – Top number of 130 or above and/or bottom number of 80 or above.  -Elevated – Top number between 120 and 129 and bottom number of 79 or below.  -Normal – Top number of 119 or below and bottom number of 79 or below.  Please continue to take metoprolol, which is used to treat both atrial fibrillation and hypertension.    Diagnosis: Swelling around both eyes  Assessment and Plan of Treatment: Please continue to apply hot packs as needed. Please contact your doctor if the swelling becomes worse or you have changes to vision

## 2022-07-07 NOTE — H&P ADULT - PROBLEM SELECTOR PLAN 2
Acute onset fever, dry coughing, and shortness of breath in conjunction with CXR indicating right mid-lobe consolidation likely secondary to pneumonia. Additionally, given her fairly recent GI symptoms, i.e. nausea, dry heaves, and 1-week of diarrhea, would consider legionella pneumonia.  -Follow up blood cultures  -Supplemental oxygen PRN  -Continue antibiotics as above  -Monitor oxygen saturation

## 2022-07-07 NOTE — DISCHARGE NOTE PROVIDER - NSDCCPTREATMENT_GEN_ALL_CORE_FT
PRINCIPAL PROCEDURE  Procedure: X-ray, chest, 1 view  Findings and Treatment: INTERPRETATION:  CLINICAL INFORMATION: Cough, fever and leukocytosis with   recent Covid vaccination. Evaluate for pneumonia.  TECHNIQUE: Frontal radiograph of the chest.  COMPARISON: Chest radiographs 6/13/2020.  FINDINGS:  Right midlung consolidation.  No pleural effusion or pneumothorax.  Heart size is within normal limits.  No acute abnormality within visible osseous structures.  IMPRESSION:  Right midlung pneumonia.

## 2022-07-07 NOTE — DISCHARGE NOTE PROVIDER - PROVIDER TOKENS
PROVIDER:[TOKEN:[[id number]],SCHEDULEDAPPT:[07/20/2022],SCHEDULEDAPPTTIME:[09:45 AM],ESTABLISHEDPATIENT:[T]],PROVIDER:[TOKEN:[[id number]],ESTABLISHEDPATIENT:[T]],FREE:[LAST:[Zara],FIRST:[Rocky],PHONE:[phone]],FAX:[(   )    -],ADDRESS:[[address] [place] [address]],ESTABLISHEDPATIENT:[T]],PROVIDER:[TOKEN:[[id number]]]

## 2022-07-07 NOTE — H&P ADULT - NSHPSOCIALHISTORY_GEN_ALL_CORE
Does not smoke or consume EtOH. 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

## 2022-07-07 NOTE — PROGRESS NOTE ADULT - PROBLEM SELECTOR PLAN 9
DVT ppx: Eliquis for afib   Diet regular diet  Disposition pending clinical improvement DVT ppx: Eliquis for afib   Diet regular diet, gluten free  Disposition pending clinical improvement

## 2022-07-07 NOTE — H&P ADULT - PROBLEM SELECTOR PLAN 3
Urinalysis positive for bacteria, pyuria, leukocyte esterase and nitrites consistent with UTI likely from E.coli. UTI may be a source of sepsis  -Follow up urine culture  -Continue antibiotics as above

## 2022-07-07 NOTE — DISCHARGE NOTE PROVIDER - CARE PROVIDER_API CALL
Mona Maldonado  CRITICAL CARE MEDICINE  [address] [place] [address], [address]  [place] [address]  Phone: [phone]  Fax: [phone]  Established Patient  Scheduled Appointment: 07/20/2022 09:45 AM    Gera Kelley  GASTROENTEROLOGY  [address] [place] [address], [address]  [place] [address]  Phone: [phone]  Fax: [phone]  Established Patient  Follow Up Time:     Rocky Zuñiga  [address] [facility] [address], [place] [address]  Phone: [phone]  Fax: (   )    -  Established Patient  Follow Up Time:     Isaac Pineda)  Hematology; Medical Oncology  [address] [facility], [address]  [place] [address]  Phone: [phone]  Fax: [phone]  Follow Up Time:

## 2022-07-07 NOTE — H&P ADULT - PROBLEM SELECTOR PLAN 1
Marked leukocytosis, borderline tachycardia, and tachypnea = SIRS + dry coughing, dyspnea, and CXR indicating right mid-lobe consolidation likely secondary to pneumonia + urinalysis positive for bacteria, pyuria, leukocyte esterase and nitrites consistent with UTI = collective concerning for sepsis.     -S/p ceftriaxone and azithromycin x1 dose  -Continue ceftriaxone and azithromycin   -Follow up blood and urine cultures  -Follow up urine legionella ag given concurrent symptoms  -Supplemental oxygen PRN  -Monitor oxygen saturation  -Trend WBC count to evaluate therapeutic response Marked leukocytosis, borderline tachycardia, and tachypnea = SIRS + dry coughing, dyspnea, and CXR indicating right mid-lobe consolidation likely secondary to pneumonia + urinalysis positive for bacteria, pyuria, leukocyte esterase and nitrites consistent with UTI = collective concerning for sepsis.     -S/p ceftriaxone and azithromycin x1 dose  -Continue ceftriaxone and azithromycin   -Follow up blood and urine cultures  -Follow up MRSA PCR  -Follow up urine legionella ag given concurrent GI symptoms  -Supplemental oxygen PRN  -Monitor oxygen saturation  -Trend WBC count to evaluate therapeutic response

## 2022-07-07 NOTE — PROGRESS NOTE ADULT - PROBLEM SELECTOR PLAN 5
Known history of Afib  -Continue home Eliquis 5mg qd  -Continue metoprolol 50mg qd Known history of Afib  -Continue home Eliquis 5mg BID  -Continue metoprolol 50mg qd

## 2022-07-07 NOTE — PROGRESS NOTE ADULT - PROBLEM SELECTOR PLAN 8
Known history of anemia with hgb ~9.7 may be from known history myelodysplastic syndrome  -Follow up iron studies Known history of anemia with hgb ~9.7 may be from known history myelodysplastic syndrome. Patient received Aranesp (Darboepoetin helen) monthly infusions previously, but none in the past 2 months  -Follow up iron studies

## 2022-07-07 NOTE — H&P ADULT - PROBLEM SELECTOR PLAN 6
Known history of HTN.  -Continue home metoprolol Known history of HTN.  -Continue home metoprolol with hold parameters

## 2022-07-07 NOTE — PROGRESS NOTE ADULT - PROBLEM SELECTOR PLAN 1
Marked leukocytosis, borderline tachycardia, and tachypnea = SIRS + dry coughing, dyspnea, and CXR indicating right mid-lobe consolidation likely secondary to pneumonia + urinalysis positive for bacteria, pyuria, leukocyte esterase and nitrites consistent with UTI = collective concerning for sepsis.     -S/p ceftriaxone and azithromycin x1 dose  -Continue ceftriaxone and azithromycin   -Follow up blood and urine cultures  -Follow up MRSA PCR  -Follow up urine legionella ag given concurrent GI symptoms  -Supplemental oxygen PRN  -Monitor oxygen saturation  -Trend WBC count to evaluate therapeutic response Marked leukocytosis, borderline tachycardia, and tachypnea = SIRS + dry coughing, dyspnea, and CXR indicating right mid-lobe consolidation likely secondary to pneumonia + urinalysis positive for bacteria, pyuria, leukocyte esterase and nitrites consistent with UTI = collective concerning for sepsis. Weaned from 3L NC to RA  -S/p ceftriaxone and azithromycin x1 dose  -Continue ceftriaxone and azithromycin   -Follow up blood and urine cultures  -Follow up MRSA PCR  -Follow up urine legionella ag given concurrent GI symptoms  -Supplemental oxygen PRN  -Monitor oxygen saturation - currently on RA  -Trend WBC count to evaluate therapeutic response

## 2022-07-07 NOTE — DISCHARGE NOTE PROVIDER - HOSPITAL COURSE
87 year-old female PMH Afib on Eliquis, myelodysplastic syndrome, HTN on metoprolol presenting with 1-day acute onset fever, headache, coughing, shortness of breath, nausea, dry heaves, palpitations, and intermittent 8/10 substernal chest pain without radiating and without known provocation, abdominal pain, urinary symptoms, or LE edema. However, patient has been experiencing watery non-bloody diarrhea for about a week. Patient admitted for sepsis 2/2 RML PNA and UTI    Hospital course (7/6 - ____): CXR significant for right middle lobe pneumonia and UA positive. Started on ceftriaxone and azithromycin. Initially on 5L NC and weaned to RA 87 year-old female PMH Afib on Eliquis, myelodysplastic syndrome, HTN on metoprolol presenting with 1-day acute onset fever, headache, coughing, shortness of breath, nausea, dry heaves, palpitations, and intermittent 8/10 substernal chest pain without radiating and without known provocation, abdominal pain, urinary symptoms, or LE edema. However, patient has been experiencing watery non-bloody diarrhea for about a week. Patient admitted for sepsis 2/2 RML PNA and UTI    Hospital course (7/6 - ____): CXR significant for right middle lobe pneumonia and UA positive for UTI. Started on ceftriaxone and azithromycin. Initially on 5L NC and weaned to RA 87 year-old female PMH Afib on Eliquis, myelodysplastic syndrome, HTN on metoprolol presenting with 1-day acute onset fever, headache, coughing, shortness of breath, nausea, dry heaves, palpitations, and intermittent 8/10 substernal chest pain without radiating and without known provocation, abdominal pain, urinary symptoms, or LE edema. However, patient has been experiencing watery non-bloody diarrhea for about a week. Patient admitted for sepsis 2/2 RML PNA and UTI    Hospital course (7/6 - ____): CXR significant for right middle lobe pneumonia, UA positive for UTI, UCx positive for gram negative rods, BCx negative, Legionella negative. Started on ceftriaxone and azithromycin. Initially on 5L NC and weaned to RA. 87 year-old female PMH Afib on Eliquis, myelodysplastic syndrome, HTN on metoprolol presenting with 1-day acute onset fever, headache, coughing, shortness of breath, nausea, dry heaves, palpitations, and intermittent 8/10 substernal chest pain without radiating and without known provocation, abdominal pain, urinary symptoms, or LE edema. However, patient has been experiencing watery non-bloody diarrhea for about a week. Patient admitted for sepsis 2/2 RML PNA and UTI    Hospital course (7/6 - 7/8): CXR significant for right middle lobe pneumonia, UA positive for UTI, UCx read as contaminated BCx negative, Legionella negative. Started on ceftriaxone and azithromycin. Initially on 5L NC and weaned to RA. Discharged on PO cefpodoxime 200mg BID 87 year-old female PMH Afib on Eliquis, myelodysplastic syndrome, HTN on metoprolol presenting with 1-day acute onset fever, headache, coughing, shortness of breath, nausea, dry heaves, palpitations, and intermittent 8/10 substernal chest pain without radiating and without known provocation, abdominal pain, urinary symptoms, or LE edema. However, patient has been experiencing watery non-bloody diarrhea for about a week. Patient admitted for sepsis 2/2 RML PNA and UTI    Hospital course (7/6 - 7/8): CXR significant for right middle lobe pneumonia, UA positive for UTI, UCx read as contaminated BCx negative, Legionella negative. Started on ceftriaxone and azithromycin. Initially on 5L NC and weaned to RA. Discharged on PO cefpodoxime 200mg BID. This is a 87 year-old female PMH Afib on Eliquis, myelodysplastic syndrome, HTN on metoprolol presenting with 1-day acute onset fever, headache, coughing, shortness of breath, nausea, dry heaves, palpitations, and intermittent 8/10 substernal chest pain without radiating and without known provocation, abdominal pain, urinary symptoms, or LE edema. However, patient has been experiencing watery non-bloody diarrhea for about a week.   In Ed she was found to be septic due to RML pneumonia as CXR findings, hypoxic and also UTI. She was started on IV Ceftriaxone and azithromycin, O2 support and some IV hydration. Blood c/s was negative, UCx read as contaminated and Legionella Ag in urine was negative. Started on ceftriaxone and azithromycin. Her breathing got improved and she didn't need O2  support and was weaned off, O2 sat was 96% on RA. PT evaluated and recommended no skilled PT needed.    She remained hemodynamically stable and was discharged on PO cefpodoxime 200mg BID for 5 days. Patient will see her Pulmonary in a week and was instructed to have yearly Flu shot and 5 yearly pneumonia vaccine with her PCP upon completion of treatment.

## 2022-07-07 NOTE — H&P ADULT - HISTORY OF PRESENT ILLNESS
87 F with PMHx of A fib on Eliquis, and metoprolol presents to the ED c/o cough, fever TMAX 100.8F, SOB, and diarrhea x today. Pt received 4th dose of COVID vaccine last Wednesday and reports hasn't been feeling well since however developed these sx today. Endorsed Tylenol without improvement.    ED Course: Afebrile; ; /85; SpO2 94%. Received 500cc NS bolus; s/p ceftriaxone and azithromycin x1 dose each.  87 year-old female PMH Afib on Eliquis, myelodysplastic syndrome, HTN on metoprolol presenting with 1-day acute onset dry fever, coughing, shortness of breath, nausea, dry heaves, palpitations, and intermittent 8/10 substernal chest pain without radiating and without known provocation, abdominal pain, urinary symptoms, or LE edema. However, patient has been experiencing watery non-bloody diarrhea for about a week. Of note, patient shares that she received a 4th dose of COVID vaccine last Wednesday and reports feeling unwell since with associated right arm soreness.     ED Course: Afebrile; ; /85; SpO2 94%. Received 500cc NS bolus; s/p ceftriaxone and azithromycin x1 dose each.  87 year-old female PMH Afib on Eliquis, myelodysplastic syndrome, HTN on metoprolol presenting with 1-day acute onset dry fever, headache, coughing, shortness of breath, nausea, dry heaves, palpitations, and intermittent 8/10 substernal chest pain without radiating and without known provocation, abdominal pain, urinary symptoms, or LE edema. However, patient has been experiencing watery non-bloody diarrhea for about a week. Of note, patient shares that she received a 4th dose of COVID vaccine last Wednesday and reports feeling unwell since with associated right arm soreness.     ED Course: Afebrile; ; /85; SpO2 94%. Received 500cc NS bolus; s/p ceftriaxone and azithromycin x1 dose each.  87 year-old female PMH Afib on Eliquis, myelodysplastic syndrome, HTN on metoprolol presenting with 1-day acute onset fever, headache, coughing, shortness of breath, nausea, dry heaves, palpitations, and intermittent 8/10 substernal chest pain without radiating and without known provocation, abdominal pain, urinary symptoms, or LE edema. However, patient has been experiencing watery non-bloody diarrhea for about a week. Of note, patient shares that she received a 4th dose of COVID vaccine last Wednesday and reports feeling unwell since with associated right arm soreness.     ED Course: Afebrile; ; /85; SpO2 94%. Received 500cc NS bolus; s/p ceftriaxone and azithromycin x1 dose each.

## 2022-07-07 NOTE — H&P ADULT - ATTENDING COMMENTS
Pt seen and examined. 87F with PMH Afib on Eliquis, myelodysplastic syndrome, HTN p/w fever, cough and SOB. Pt states she has been having worsening dry cough with associated SOB; also notes diarrhea, NB with associated nausea, no vomiting at home. Pt also notes dysuria this past week. +fevers but no chills at home. Endorses fatigue and weakness, poor PO intake. Found to be septic with leukocytosis and tachycardia. In ED, had episode of emesis NBNB with subsequent hypoxia to 89%, resolved with 5LNC which has been titrated down to 3LNC.    #Sepsis 2/2 PNA, UTI  #Hypoxic respiratory failure   #Chest pain   #chronic Afib   - c/w ceftriaxone/azithro, f/u all culture data, monitor vitals closely   - hypoxia likely 2/2 aspiration in setting of PNA, improving, titrate O2 as tolerated   - CP free currently, delta trop flat, EKG non ischemic - c/t monitor   - c/w eliquis and metoprolol     rest of plan as above     d/w patient and Dr Maldonado

## 2022-07-07 NOTE — PROGRESS NOTE ADULT - ATTENDING COMMENTS
This is a 87 year-old female PMH Afib on Eliquis, myelodysplastic syndrome, HTN on metoprolol presenting acute onset fever, coughing, dyspnea. She was found to have sepsis with pneumonia- RML and positive urine for UTI.    1. Sepsis due to CAP, UTI  2. Permanent a.fib with RVR  3. MDS    - Afebrile, breathing improving, O2 sat 98% RA,, WBC 19, CXR- RML Pna, UA positive  c/w IV CTX/Azithromycin, will f/u blood and urine c/s  - PT eval, o2 sat on ambulation  - Flu shot, Pna shot on d/c

## 2022-07-08 ENCOUNTER — TRANSCRIPTION ENCOUNTER (OUTPATIENT)
Age: 87
End: 2022-07-08

## 2022-07-08 VITALS
TEMPERATURE: 98 F | DIASTOLIC BLOOD PRESSURE: 84 MMHG | RESPIRATION RATE: 18 BRPM | SYSTOLIC BLOOD PRESSURE: 130 MMHG | HEART RATE: 92 BPM | OXYGEN SATURATION: 94 %

## 2022-07-08 DIAGNOSIS — R22.0 LOCALIZED SWELLING, MASS AND LUMP, HEAD: ICD-10-CM

## 2022-07-08 LAB
ANION GAP SERPL CALC-SCNC: 10 MMOL/L — SIGNIFICANT CHANGE UP (ref 5–17)
APTT BLD: 29 SEC — SIGNIFICANT CHANGE UP (ref 27.5–35.5)
BASOPHILS # BLD AUTO: 0.06 K/UL — SIGNIFICANT CHANGE UP (ref 0–0.2)
BASOPHILS NFR BLD AUTO: 0.8 % — SIGNIFICANT CHANGE UP (ref 0–2)
BUN SERPL-MCNC: 8 MG/DL — SIGNIFICANT CHANGE UP (ref 7–23)
CALCIUM SERPL-MCNC: 8.1 MG/DL — LOW (ref 8.4–10.5)
CHLORIDE SERPL-SCNC: 107 MMOL/L — SIGNIFICANT CHANGE UP (ref 96–108)
CO2 SERPL-SCNC: 21 MMOL/L — LOW (ref 22–31)
CREAT SERPL-MCNC: 0.58 MG/DL — SIGNIFICANT CHANGE UP (ref 0.5–1.3)
CULTURE RESULTS: SIGNIFICANT CHANGE UP
EGFR: 88 ML/MIN/1.73M2 — SIGNIFICANT CHANGE UP
EOSINOPHIL # BLD AUTO: 0.06 K/UL — SIGNIFICANT CHANGE UP (ref 0–0.5)
EOSINOPHIL NFR BLD AUTO: 0.8 % — SIGNIFICANT CHANGE UP (ref 0–6)
GLUCOSE SERPL-MCNC: 84 MG/DL — SIGNIFICANT CHANGE UP (ref 70–99)
HCT VFR BLD CALC: 24.6 % — LOW (ref 34.5–45)
HGB BLD-MCNC: 8 G/DL — LOW (ref 11.5–15.5)
IMM GRANULOCYTES NFR BLD AUTO: 0.7 % — SIGNIFICANT CHANGE UP (ref 0–1.5)
INR BLD: 1.54 RATIO — HIGH (ref 0.88–1.16)
LYMPHOCYTES # BLD AUTO: 1.26 K/UL — SIGNIFICANT CHANGE UP (ref 1–3.3)
LYMPHOCYTES # BLD AUTO: 16.8 % — SIGNIFICANT CHANGE UP (ref 13–44)
MAGNESIUM SERPL-MCNC: 1.8 MG/DL — SIGNIFICANT CHANGE UP (ref 1.6–2.6)
MCHC RBC-ENTMCNC: 32 PG — SIGNIFICANT CHANGE UP (ref 27–34)
MCHC RBC-ENTMCNC: 32.5 GM/DL — SIGNIFICANT CHANGE UP (ref 32–36)
MCV RBC AUTO: 98.4 FL — SIGNIFICANT CHANGE UP (ref 80–100)
MONOCYTES # BLD AUTO: 0.44 K/UL — SIGNIFICANT CHANGE UP (ref 0–0.9)
MONOCYTES NFR BLD AUTO: 5.9 % — SIGNIFICANT CHANGE UP (ref 2–14)
NEUTROPHILS # BLD AUTO: 5.62 K/UL — SIGNIFICANT CHANGE UP (ref 1.8–7.4)
NEUTROPHILS NFR BLD AUTO: 75 % — SIGNIFICANT CHANGE UP (ref 43–77)
NRBC # BLD: 0 /100 WBCS — SIGNIFICANT CHANGE UP (ref 0–0)
PHOSPHATE SERPL-MCNC: 2.3 MG/DL — LOW (ref 2.5–4.5)
PLATELET # BLD AUTO: 203 K/UL — SIGNIFICANT CHANGE UP (ref 150–400)
POTASSIUM SERPL-MCNC: 3.7 MMOL/L — SIGNIFICANT CHANGE UP (ref 3.5–5.3)
POTASSIUM SERPL-SCNC: 3.7 MMOL/L — SIGNIFICANT CHANGE UP (ref 3.5–5.3)
PROTHROM AB SERPL-ACNC: 17.8 SEC — HIGH (ref 10.5–13.4)
RBC # BLD: 2.5 M/UL — LOW (ref 3.8–5.2)
RBC # FLD: 22.1 % — HIGH (ref 10.3–14.5)
RH IG SCN BLD-IMP: POSITIVE — SIGNIFICANT CHANGE UP
SODIUM SERPL-SCNC: 138 MMOL/L — SIGNIFICANT CHANGE UP (ref 135–145)
SPECIMEN SOURCE: SIGNIFICANT CHANGE UP
WBC # BLD: 7.49 K/UL — SIGNIFICANT CHANGE UP (ref 3.8–10.5)
WBC # FLD AUTO: 7.49 K/UL — SIGNIFICANT CHANGE UP (ref 3.8–10.5)

## 2022-07-08 PROCEDURE — 87449 NOS EACH ORGANISM AG IA: CPT

## 2022-07-08 PROCEDURE — 82803 BLOOD GASES ANY COMBINATION: CPT

## 2022-07-08 PROCEDURE — 82330 ASSAY OF CALCIUM: CPT

## 2022-07-08 PROCEDURE — 85025 COMPLETE CBC W/AUTO DIFF WBC: CPT

## 2022-07-08 PROCEDURE — 84132 ASSAY OF SERUM POTASSIUM: CPT

## 2022-07-08 PROCEDURE — 85730 THROMBOPLASTIN TIME PARTIAL: CPT

## 2022-07-08 PROCEDURE — 86900 BLOOD TYPING SEROLOGIC ABO: CPT

## 2022-07-08 PROCEDURE — 97161 PT EVAL LOW COMPLEX 20 MIN: CPT

## 2022-07-08 PROCEDURE — 85014 HEMATOCRIT: CPT

## 2022-07-08 PROCEDURE — 0225U NFCT DS DNA&RNA 21 SARSCOV2: CPT

## 2022-07-08 PROCEDURE — 84295 ASSAY OF SERUM SODIUM: CPT

## 2022-07-08 PROCEDURE — 81001 URINALYSIS AUTO W/SCOPE: CPT

## 2022-07-08 PROCEDURE — 87640 STAPH A DNA AMP PROBE: CPT

## 2022-07-08 PROCEDURE — 82565 ASSAY OF CREATININE: CPT

## 2022-07-08 PROCEDURE — 85018 HEMOGLOBIN: CPT

## 2022-07-08 PROCEDURE — 83735 ASSAY OF MAGNESIUM: CPT

## 2022-07-08 PROCEDURE — 82947 ASSAY GLUCOSE BLOOD QUANT: CPT

## 2022-07-08 PROCEDURE — 36415 COLL VENOUS BLD VENIPUNCTURE: CPT

## 2022-07-08 PROCEDURE — 83880 ASSAY OF NATRIURETIC PEPTIDE: CPT

## 2022-07-08 PROCEDURE — 87040 BLOOD CULTURE FOR BACTERIA: CPT

## 2022-07-08 PROCEDURE — 87641 MR-STAPH DNA AMP PROBE: CPT

## 2022-07-08 PROCEDURE — 84145 PROCALCITONIN (PCT): CPT

## 2022-07-08 PROCEDURE — 80048 BASIC METABOLIC PNL TOTAL CA: CPT

## 2022-07-08 PROCEDURE — 86901 BLOOD TYPING SEROLOGIC RH(D): CPT

## 2022-07-08 PROCEDURE — 80053 COMPREHEN METABOLIC PANEL: CPT

## 2022-07-08 PROCEDURE — 96375 TX/PRO/DX INJ NEW DRUG ADDON: CPT

## 2022-07-08 PROCEDURE — 99232 SBSQ HOSP IP/OBS MODERATE 35: CPT | Mod: GC

## 2022-07-08 PROCEDURE — 85610 PROTHROMBIN TIME: CPT

## 2022-07-08 PROCEDURE — 83605 ASSAY OF LACTIC ACID: CPT

## 2022-07-08 PROCEDURE — 99285 EMERGENCY DEPT VISIT HI MDM: CPT | Mod: 25

## 2022-07-08 PROCEDURE — 87086 URINE CULTURE/COLONY COUNT: CPT

## 2022-07-08 PROCEDURE — 82435 ASSAY OF BLOOD CHLORIDE: CPT

## 2022-07-08 PROCEDURE — 84484 ASSAY OF TROPONIN QUANT: CPT

## 2022-07-08 PROCEDURE — 96374 THER/PROPH/DIAG INJ IV PUSH: CPT

## 2022-07-08 PROCEDURE — 93005 ELECTROCARDIOGRAM TRACING: CPT

## 2022-07-08 PROCEDURE — 84100 ASSAY OF PHOSPHORUS: CPT

## 2022-07-08 PROCEDURE — 71045 X-RAY EXAM CHEST 1 VIEW: CPT

## 2022-07-08 RX ORDER — SODIUM,POTASSIUM PHOSPHATES 278-250MG
1 POWDER IN PACKET (EA) ORAL
Refills: 0 | Status: COMPLETED | OUTPATIENT
Start: 2022-07-08 | End: 2022-07-08

## 2022-07-08 RX ORDER — CEFPODOXIME PROXETIL 100 MG
1 TABLET ORAL
Qty: 8 | Refills: 0
Start: 2022-07-08 | End: 2022-07-11

## 2022-07-08 RX ADMIN — Medication 50 MILLIGRAM(S): at 05:50

## 2022-07-08 RX ADMIN — APIXABAN 5 MILLIGRAM(S): 2.5 TABLET, FILM COATED ORAL at 05:51

## 2022-07-08 RX ADMIN — Medication 100 MILLIGRAM(S): at 05:51

## 2022-07-08 RX ADMIN — CEFTRIAXONE 100 MILLIGRAM(S): 500 INJECTION, POWDER, FOR SOLUTION INTRAMUSCULAR; INTRAVENOUS at 05:50

## 2022-07-08 RX ADMIN — Medication 1 PACKET(S): at 17:03

## 2022-07-08 RX ADMIN — APIXABAN 5 MILLIGRAM(S): 2.5 TABLET, FILM COATED ORAL at 17:03

## 2022-07-08 RX ADMIN — AZITHROMYCIN 255 MILLIGRAM(S): 500 TABLET, FILM COATED ORAL at 05:50

## 2022-07-08 RX ADMIN — Medication 1 PACKET(S): at 09:05

## 2022-07-08 RX ADMIN — Medication 100 MILLIGRAM(S): at 13:38

## 2022-07-08 NOTE — PROGRESS NOTE ADULT - PROBLEM SELECTOR PLAN 6
Known history of HTN.  -Continue home metoprolol with hold parameters Known history of Afib  -Continue home Eliquis 5mg BID  -Continue metoprolol 50mg qd

## 2022-07-08 NOTE — PROGRESS NOTE ADULT - PROBLEM SELECTOR PLAN 7
Known history for over past 5 years.  -Currently appears to not be an active issue Known history of HTN.  -Continue home metoprolol with hold parameters

## 2022-07-08 NOTE — CHART NOTE - NSCHARTNOTEFT_GEN_A_CORE
Advanced Care Planning:  I have had goals of care discussion, advanced directive and code status with patient/family and  in  coordinating patient care. All questions answered and expressed understanding of the plan. Patient is full code.

## 2022-07-08 NOTE — PROGRESS NOTE ADULT - PROBLEM SELECTOR PLAN 2
Acute onset fever, dry coughing, and shortness of breath in conjunction with CXR indicating right mid-lobe consolidation likely secondary to pneumonia. Additionally, given her fairly recent GI symptoms, i.e. nausea, dry heaves, and 1-week of diarrhea, would consider legionella pneumonia.  -Follow up blood cultures  -Supplemental oxygen PRN, currently on RA  -Continue antibiotics as above  -Monitor oxygen saturation Acute onset fever, dry coughing, and shortness of breath in conjunction with CXR indicating right mid-lobe consolidation likely secondary to pneumonia. Additionally, given her fairly recent GI symptoms, i.e. nausea, dry heaves, and 1-week of diarrhea, would consider legionella pneumonia.  -BCx negative to date  -Supplemental oxygen PRN, currently on RA  -Continue antibiotics as above  -Monitor oxygen saturation

## 2022-07-08 NOTE — DISCHARGE NOTE NURSING/CASE MANAGEMENT/SOCIAL WORK - HAVE YOU HAD A SECOND COVID-19 BOOSTER?
Assessment:    postop right total knee arthroplasty    Plan:  Patient is doing very well  The palpable nodules she feels his the suture from quadricep tendon repair  She was counseled on deep tissue massage  I want her to continue being as aggressive as possible with her exercise  Follow-up at her year anniversary with x-rays of the right knee  Patient ID: Pina Rivas is a 62 y o  female  Chief Complaint:  Right knee TKA     HPI:  62year old female s/p right TKA  She overall is doing well, she is doing high intensity exercise  Denies instability  She notes discomfort over nodules superior incision, lumps have been more evident since reduction in swelling  Denies fever,chills         Allergy:  Allergies   Allergen Reactions    Gentamycin [Gentamicin] Hives     Ointment  used for eye and developed hives    Sutures [Suture] Swelling     Internal sutures from 5 years ago on left ankle, wound opened up slightly       Medications:  all current active meds have been reviewed    Past Medical History:  Past Medical History:   Diagnosis Date    Alcoholism ([address] )     Arthritis     Depression     Hypercholesterolemia     Hypertension     Psoriasis        Past Surgical History:  Past Surgical History:   Procedure Laterality Date    ANKLE SURGERY      CERVICAL BIOPSY  W/ LOOP ELECTRODE EXCISION      DILATION AND CURETTAGE, DIAGNOSTIC / THERAPEUTIC      KNEE SURGERY      ORIF ACETABULUM FRACTURE      ID TOTAL KNEE ARTHROPLASTY Right 4/22/2019    Procedure: ARTHROPLASTY RIGHT KNEE TOTAL;  Surgeon: Caro Sexton MD;  Location:  MAIN OR;  Service: Orthopedics    PREPATELLAR BURSA EXCISION      TOOTH EXTRACTION         Family History:  Family History   Problem Relation Age of Onset    Cancer Mother     Diabetes Mother     Cancer Father     Alcohol abuse Sister     Drug abuse Sister     Alcohol abuse Brother     Drug abuse Brother        Social History:  Social History     Substance and Sexual Activity   Alcohol Use Not Currently    Frequency: Never    Comment: recovering      Social History     Substance and Sexual Activity   Drug Use Never     Social History     Tobacco Use   Smoking Status Never Smoker   Smokeless Tobacco Never Used           ROS:  Review of Systems    Objective:  BP Readings from Last 1 Encounters:   06/11/19 132/80      Wt Readings from Last 1 Encounters:   06/11/19 78 kg (172 lb)        BMI:   Estimated body mass index is 29 52 kg/m² as calculated from the following:    Height as of 6/11/19: 5' 4" (1 626 m)  Weight as of 6/11/19: 78 kg (172 lb)  EXAM:   Physical Exam   Musculoskeletal:        Right knee: She exhibits no effusion  Right Knee Exam     Tenderness   The patient is experiencing no tenderness  Range of Motion   Extension: 0   Flexion: 130     Tests   Varus: negative Valgus: negative  Lachman:  Anterior - negative    Posterior - negative  Drawer:  Anterior - negative        Other   Erythema: absent  Scars: present (well healed incision)  Sensation: normal  Pulse: present  Swelling: none  Effusion: no effusion present    Comments:  Patient has Ethibond sutures which are palpable superior incision, no abnormal masses  Radiographs:  No new images to review        Patient overall is doing very well in regards to her total knee , no instability, good strength, explained again nodules she feels are Ethibond  Sutures and scar tissue  Continue with scar massage  Discussed if sutures still bother her in future can trim them out  See back at year aaliyah  No

## 2022-07-08 NOTE — PROGRESS NOTE ADULT - SUBJECTIVE AND OBJECTIVE BOX
DATE OF SERVICE: 22 @ 07:30    Patient is a 87y old  Female who presents with a chief complaint of pneumonia (2022 14:48)      SUBJECTIVE / OVERNIGHT EVENTS: INCOMPLETE    MEDICATIONS  (STANDING):  apixaban 5 milliGRAM(s) Oral two times a day  azithromycin  IVPB 500 milliGRAM(s) IV Intermittent every 24 hours  benzonatate 100 milliGRAM(s) Oral three times a day  cefTRIAXone   IVPB 1000 milliGRAM(s) IV Intermittent every 24 hours  metoprolol succinate ER 50 milliGRAM(s) Oral daily    MEDICATIONS  (PRN):  acetaminophen     Tablet .. 650 milliGRAM(s) Oral every 6 hours PRN Temp greater or equal to 38C (100.4F), Mild Pain (1 - 3), Moderate Pain (4 - 6)  ondansetron Injectable 4 milliGRAM(s) IV Push every 8 hours PRN Nausea and/or Vomiting      Vital Signs Last 24 Hrs  T(C): 37.1 (2022 05:11), Max: 37.3 (2022 12:21)  T(F): 98.8 (2022 05:11), Max: 99.2 (2022 12:21)  HR: 100 (2022 05:11) (85 - 150)  BP: 133/70 (2022 05:11) (115/67 - 133/70)  BP(mean): --  RR: 18 (2022 05:11) (17 - 18)  SpO2: 96% (2022 05:11) (94% - 97%)    Parameters below as of 2022 05:11  Patient On (Oxygen Delivery Method): nasal cannula      CAPILLARY BLOOD GLUCOSE        I&O's Summary      PHYSICAL EXAM:  GENERAL: NAD, well-developed  HEAD:  Atraumatic, Normocephalic  EYES: EOMI, PERRLA, conjunctiva and sclera clear  NECK: Supple, No JVD  CHEST/LUNG: Clear to auscultation bilaterally; No wheeze  HEART: Regular rate and rhythm; No murmurs, rubs, or gallops  ABDOMEN: Soft, Nontender, Nondistended; Bowel sounds present  EXTREMITIES:  2+ Peripheral Pulses, No clubbing, cyanosis, or edema  PSYCH: AAOx3  NEUROLOGY: non-focal  SKIN: No rashes or lesions    LABS:                        9.7    19.64 )-----------( 218      ( 2022 20:50 )             29.1     -    137  |  106  |  13  ----------------------------<  123<H>  3.9   |  18<L>  |  0.68    Ca    8.0<L>      2022 00:13  Phos  3.0     07-  Mg     1.4     -    TPro  6.5  /  Alb  4.1  /  TBili  1.4<H>  /  DBili  x   /  AST  22  /  ALT  18  /  AlkPhos  87  -          Urinalysis Basic - ( 2022 01:55 )    Color: Yellow / Appearance: Slightly Turbid / S.022 / pH: x  Gluc: x / Ketone: Negative  / Bili: Negative / Urobili: Negative   Blood: x / Protein: 30 mg/dL / Nitrite: Positive   Leuk Esterase: Large / RBC: 3 /hpf / WBC 61 /HPF   Sq Epi: x / Non Sq Epi: 1 /hpf / Bacteria: Moderate        RADIOLOGY & ADDITIONAL TESTS:    Imaging Personally Reviewed:    Consultant(s) Notes Reviewed:      Care Discussed with Consultants/Other Providers:   DATE OF SERVICE: 07-08-22 @ 07:30    Patient is a 87y old  Female who presents with a chief complaint of pneumonia (07 Jul 2022 14:48)      SUBJECTIVE / OVERNIGHT EVENTS: NAOE    Patient states shortness of breath improving, cough is unchanged. No dysuria. Continues to have R arm pain that she attributes to receiving COVID-19 booster last Wed. She is concerned about her eye swelling, but denies eye pain. Endorses some irritation from the nasal cannula, but otherwise denies pain. Endorses good appetite and has been ambulating around the unit without difficulty.    MEDICATIONS  (STANDING):  apixaban 5 milliGRAM(s) Oral two times a day  azithromycin  IVPB 500 milliGRAM(s) IV Intermittent every 24 hours  benzonatate 100 milliGRAM(s) Oral three times a day  cefTRIAXone   IVPB 1000 milliGRAM(s) IV Intermittent every 24 hours  metoprolol succinate ER 50 milliGRAM(s) Oral daily    MEDICATIONS  (PRN):  acetaminophen     Tablet .. 650 milliGRAM(s) Oral every 6 hours PRN Temp greater or equal to 38C (100.4F), Mild Pain (1 - 3), Moderate Pain (4 - 6)  ondansetron Injectable 4 milliGRAM(s) IV Push every 8 hours PRN Nausea and/or Vomiting      Vital Signs Last 24 Hrs  T(C): 37.1 (08 Jul 2022 05:11), Max: 37.3 (07 Jul 2022 12:21)  T(F): 98.8 (08 Jul 2022 05:11), Max: 99.2 (07 Jul 2022 12:21)  HR: 100 (08 Jul 2022 05:11) (85 - 150)  BP: 133/70 (08 Jul 2022 05:11) (115/67 - 133/70)  BP(mean): --  RR: 18 (08 Jul 2022 05:11) (17 - 18)  SpO2: 96% (08 Jul 2022 05:11) (94% - 97%)    Parameters below as of 08 Jul 2022 05:11  Patient On (Oxygen Delivery Method): nasal cannula      CAPILLARY BLOOD GLUCOSE        I&O's Summary      PHYSICAL EXAM:  GENERAL: NAD, well-developed  HEAD:  Atraumatic, Normocephalic. Periorbital swelling b/l. Mild redness under nasal cannula around cheeks.  EYES: EOMI, conjunctiva and sclera clear  NECK: Supple, No JVD  CHEST/LUNG: Scattered wheezes, improved from yesterday. Unchanged right basilar crackles  HEART: Irregular rate; No murmurs, rubs, or gallops  ABDOMEN: Soft, Nontender, Nondistended; Bowel sounds present  EXTREMITIES:  2+ Peripheral Pulses, No clubbing, cyanosis, or edema  PSYCH: euthymic  NEUROLOGY: non-focal, A&Ox3  SKIN: No rashes or lesions    LABS:             LABS:                        8.0    7.49  )-----------( 203      ( 08 Jul 2022 07:24 )             24.6     07-08    138  |  107  |  8   ----------------------------<  84  3.7   |  21<L>  |  0.58    Ca    8.1<L>      08 Jul 2022 07:21  Phos  2.3     07-08  Mg     1.8     07-08    TPro  6.5  /  Alb  4.1  /  TBili  1.4<H>  /  DBili  x   /  AST  22  /  ALT  18  /  AlkPhos  87  07-06        PT/INR - ( 08 Jul 2022 07:25 )   PT: 17.8 sec;   INR: 1.54 ratio         PTT - ( 08 Jul 2022 07:25 )  PTT:29.0 sec      Urine culture: 50,000 - 99,000 CFU/mL Gram Negative Rods (07.07.22 @ 01:55)    Blood culture: No growth to date. (07.06.22 @ 21:30)     RADIOLOGY & ADDITIONAL TESTS:    Imaging Personally Reviewed:    Consultant(s) Notes Reviewed:      Care Discussed with Consultants/Other Providers:

## 2022-07-08 NOTE — DISCHARGE NOTE NURSING/CASE MANAGEMENT/SOCIAL WORK - NSDCPEFALRISK_GEN_ALL_CORE
For information on Fall & Injury Prevention, visit: [email or web address] OR  [email or web address] OR  https://www.cdc.gov/steadi/patient.html

## 2022-07-08 NOTE — PROGRESS NOTE ADULT - PROBLEM SELECTOR PLAN 5
Known history of Afib  -Continue home Eliquis 5mg BID  -Continue metoprolol 50mg qd Patient experienced intermittent 8/10 substernal chest pain without radiating and without known provocation. Ddx: costochondritis, MI, cough-induced, acid-reflux.   -Cardiac troponin are unremarkable  -Not currently experiencing chest pain  -Follow up ECG  -Continue to monitor for now

## 2022-07-08 NOTE — PROGRESS NOTE ADULT - PROBLEM SELECTOR PLAN 10
DVT ppx: Eliquis for afib   Diet regular diet, gluten free  Disposition pending clinical improvement

## 2022-07-08 NOTE — PROGRESS NOTE ADULT - PROBLEM SELECTOR PLAN 9
DVT ppx: Eliquis for afib   Diet regular diet, gluten free  Disposition pending clinical improvement Known history of anemia with hgb ~9.7 may be from known history myelodysplastic syndrome. Patient received Aranesp (Darboepoetin helen) monthly infusions previously, but none in the past 2 months  -Follow up iron studies

## 2022-07-08 NOTE — PROGRESS NOTE ADULT - PROBLEM SELECTOR PLAN 4
Patient experienced intermittent 8/10 substernal chest pain without radiating and without known provocation. Ddx: costochondritis, MI, cough-induced, acid-reflux.   -Cardiac troponin are unremarkable  -Not currently experiencing chest pain  -Follow up ECG  -Continue to monitor for now Started with symptoms  - warm compresses PRN

## 2022-07-08 NOTE — PROGRESS NOTE ADULT - PROBLEM SELECTOR PLAN 8
Known history of anemia with hgb ~9.7 may be from known history myelodysplastic syndrome. Patient received Aranesp (Darboepoetin helen) monthly infusions previously, but none in the past 2 months  -Follow up iron studies Known history for over past 5 years.  -Currently appears to not be an active issue

## 2022-07-08 NOTE — DISCHARGE NOTE NURSING/CASE MANAGEMENT/SOCIAL WORK - PATIENT PORTAL LINK FT
You can access the FollowMyHealth Patient Portal offered by [facility] by registering at the following website: [email or web address]. By joining [facility]’s FollowMyHealth portal, you will also be able to view your health information using other applications (apps) compatible with our system.

## 2022-07-08 NOTE — PROGRESS NOTE ADULT - PROBLEM SELECTOR PLAN 1
Marked leukocytosis, borderline tachycardia, and tachypnea = SIRS + dry coughing, dyspnea, and CXR indicating right mid-lobe consolidation likely secondary to pneumonia + urinalysis positive for bacteria, pyuria, leukocyte esterase and nitrites consistent with UTI = collective concerning for sepsis. Weaned from 3L NC to RA  -S/p ceftriaxone and azithromycin x1 dose  -Continue ceftriaxone and azithromycin   -Follow up blood and urine cultures  -Follow up MRSA PCR  -Follow up urine legionella ag given concurrent GI symptoms  -Supplemental oxygen PRN  -Monitor oxygen saturation - currently on RA  -Trend WBC count to evaluate therapeutic response Marked leukocytosis, borderline tachycardia, and tachypnea = SIRS + dry coughing, dyspnea, and CXR indicating right mid-lobe consolidation likely secondary to pneumonia + urinalysis positive for bacteria, pyuria, leukocyte esterase and nitrites consistent with UTI = collective concerning for sepsis. Weaned from 3L NC to RA  - UCx positive for gram negative rods (prelim)  - BCx, MRSA, legionella negative  -Continue ceftriaxone and azithromycin   -Supplemental oxygen PRN  -Monitor oxygen saturation - currently on RA  -Trend WBC count to evaluate therapeutic response Marked leukocytosis, borderline tachycardia, and tachypnea = SIRS + dry coughing, dyspnea, and CXR indicating right mid-lobe consolidation likely secondary to pneumonia + urinalysis positive for bacteria, pyuria, leukocyte esterase and nitrites consistent with UTI = collective concerning for sepsis. Weaned from 3L NC to RA  - BCx, MRSA, legionella negative  - Transition to PO abx  -Supplemental oxygen PRN  -Monitor oxygen saturation - currently on RA  -Trend WBC count to evaluate therapeutic response

## 2022-07-08 NOTE — PROGRESS NOTE ADULT - PROBLEM SELECTOR PLAN 3
Urinalysis positive for bacteria, pyuria, leukocyte esterase and nitrites consistent with UTI likely from E.coli. UTI may be a source of sepsis  -Follow up urine culture  -Continue antibiotics as above Urinalysis positive for bacteria, pyuria, leukocyte esterase and nitrites consistent with UTI likely from E.coli. UTI may be a source of sepsis  -UCx positive for gram negative rods  -Continue antibiotics as above Urinalysis positive for bacteria, pyuria, leukocyte esterase and nitrites consistent with UTI likely from E.coli. UTI may be a source of sepsis  -Continue antibiotics as above

## 2022-07-08 NOTE — PROGRESS NOTE ADULT - ATTENDING COMMENTS
This is a 87 year-old female PMH Afib on Eliquis, myelodysplastic syndrome, HTN on metoprolol presenting acute onset fever, coughing, dyspnea. She was found to have sepsis with pneumonia- RML and positive urine for UTI.    1. Sepsis due to CAP, UTI  2. Permanent a.fib with RVR  3. MDS    - improving breathing, not hypoxic, afebrile, O2 sat 98% RA,, WBC 7.4 from 19, CXR- RML Pna, UA positive  - c/w IV Ceftriaxone, d/c Azithromycin as urine legionella Ag neg. Blood and urine c/s neg  - PT evaluated, no need of PT  - Flu shot, Pna shot on d/c with PCP after completion of PO anbx.  - d/c planning in progress on PO Cefpodoxime 200mg bid for 5 more days  - Flu shot and Pna Vaccine outpatient by PCP after completion of nabx This is a 87 year-old female PMH Afib on Eliquis, myelodysplastic syndrome, HTN on metoprolol presenting acute onset fever, coughing, dyspnea. She was found to have sepsis with pneumonia- RML and positive urine for UTI.    1. Sepsis due to CAP, UTI  2. Permanent a.fib with RVR  3. MDS    - improving breathing, not hypoxic, afebrile, O2 sat 98% RA,, WBC 7.4 from 19, CXR- RML Pna, UA positive  - c/w IV Ceftriaxone, d/c Azithromycin as urine legionella Ag neg. Blood and urine c/s neg  - PT evaluated, no need of PT  - Flu shot, Pna shot on d/c with PCP after completion of PO anbx.  - d/c planning in progress on PO Cefpodoxime 200mg bid for 5 more days  - Flu shot and Pna Vaccine outpatient by PCP after completion of antibiotic  - d/c home today, outpatient f/u with her Pulmonary  - d/c time 42 min    GOC- full code

## 2022-07-08 NOTE — PROGRESS NOTE ADULT - ASSESSMENT
87 year-old female PMH Afib on Eliquis, myelodysplastic syndrome, HTN on metoprolol presenting acute onset fever, coughing, dyspnea with pneumonia on CXR and UA positive for UTI concerning for sepsis.    INCOMPLETE 87 year-old female PMH Afib on Eliquis, myelodysplastic syndrome, HTN on metoprolol presenting acute onset fever, coughing, dyspnea with pneumonia on CXR and UA positive for UTI concerning for sepsis.

## 2022-07-11 PROBLEM — D46.9 MYELODYSPLASTIC SYNDROME, UNSPECIFIED: Chronic | Status: ACTIVE | Noted: 2022-07-07

## 2022-07-11 PROBLEM — I48.91 UNSPECIFIED ATRIAL FIBRILLATION: Chronic | Status: ACTIVE | Noted: 2022-07-07

## 2022-07-11 PROBLEM — U07.1 COVID-19: Chronic | Status: ACTIVE | Noted: 2022-07-07

## 2022-07-12 LAB
CULTURE RESULTS: SIGNIFICANT CHANGE UP
CULTURE RESULTS: SIGNIFICANT CHANGE UP
SPECIMEN SOURCE: SIGNIFICANT CHANGE UP
SPECIMEN SOURCE: SIGNIFICANT CHANGE UP

## 2022-07-15 ENCOUNTER — APPOINTMENT (OUTPATIENT)
Age: 87
End: 2022-07-15

## 2022-07-15 VITALS
HEART RATE: 98 BPM | OXYGEN SATURATION: 98 % | DIASTOLIC BLOOD PRESSURE: 78 MMHG | SYSTOLIC BLOOD PRESSURE: 126 MMHG | TEMPERATURE: 206.96 F | RESPIRATION RATE: 18 BRPM

## 2022-07-15 DIAGNOSIS — J18.9 PNEUMONIA, UNSPECIFIED ORGANISM: ICD-10-CM

## 2022-07-15 DIAGNOSIS — I10 ESSENTIAL (PRIMARY) HYPERTENSION: ICD-10-CM

## 2022-07-15 PROCEDURE — 99348 HOME/RES VST EST LOW MDM 30: CPT

## 2022-07-15 RX ORDER — FLUTICASONE PROPIONATE 50 UG/1
50 SPRAY, METERED NASAL
Qty: 16 | Refills: 0 | Status: DISCONTINUED | COMMUNITY
Start: 2020-01-28 | End: 2022-07-15

## 2022-07-15 RX ORDER — ALBUTEROL SULFATE 90 UG/1
108 (90 BASE) INHALANT RESPIRATORY (INHALATION)
Qty: 25.5 | Refills: 1 | Status: DISCONTINUED | COMMUNITY
Start: 2020-06-09 | End: 2022-07-15

## 2022-07-15 RX ORDER — APIXABAN 5 MG/1
5 TABLET, FILM COATED ORAL
Refills: 0 | Status: ACTIVE | COMMUNITY

## 2022-07-15 RX ORDER — ALBUTEROL SULFATE 90 UG/1
108 (90 BASE) AEROSOL, METERED RESPIRATORY (INHALATION)
Qty: 1 | Refills: 1 | Status: DISCONTINUED | COMMUNITY
Start: 2020-06-09 | End: 2022-07-15

## 2022-07-15 RX ORDER — METOPROLOL SUCCINATE 50 MG/1
50 TABLET, EXTENDED RELEASE ORAL DAILY
Refills: 0 | Status: ACTIVE | COMMUNITY

## 2022-07-15 NOTE — HISTORY OF PRESENT ILLNESS
[Post-hospitalization from ___ Hospital] : Post-hospitalization from [unfilled] Hospital [Discharge Summary] : discharge summary [Pertinent Labs] : pertinent labs [Radiology Findings] : radiology findings [Discharge Med List] : discharge medication list [Med Reconciliation] : medication reconciliation has been completed [Patient Contacted By: ____] : and contacted by [unfilled] [Admitted on: ___] : The patient was admitted on [unfilled] [Discharged on ___] : discharged on [unfilled] [FreeTextEntry3] : [facility] Hospitalization: Pneumonia [FreeTextEntry2] : Mrs. Bocanegra is a 88 y/o woman with AFIB, myelodysplastic syndrome, HTN with recent admission for Pneumonia and UTI at [facility] from 7/6/22 - 7/8/22. Discharged home with home care services.\par \par Mrs. Bocanegra seen in the home today. Observed patient ambulate independently and in no signs of distress. Currently, feeling well. She reports feeling fatigues because she sometimes has bouts of insomnia. Adhering with prescribed medications. She has completed abx course. Home care RN has made home visit. \par She followed up with heme/onc Dr. Pineda on 7/14/22 and Dr. Kelley on 7/12/22.  Next week she is scheduled to see Pulmonologist, Dr. Martinez and Urologist, Dr. Wilson. \par Vaccine Status: She was advised by her PCP to wait until September for pneumonia vaccine. She is declining influenza vaccine.\par Covid vaccine- up to date. \par

## 2022-07-15 NOTE — HEALTH RISK ASSESSMENT
[Never] : Never [Yes] : Yes [Monthly or less (1 pt)] : Monthly or less (1 point) [No falls in past year] : Patient reported no falls in the past year [None] : None [Alone] : lives alone [] :  [# Of Children ___] : has [unfilled] children [Feels Safe at Home] : Feels safe at home [Fully functional (bathing, dressing, toileting, transferring, walking, feeding)] : Fully functional (bathing, dressing, toileting, transferring, walking, feeding) [Fully functional (using the telephone, shopping, preparing meals, housekeeping, doing laundry, using] : Fully functional and needs no help or supervision to perform IADLs (using the telephone, shopping, preparing meals, housekeeping, doing laundry, using transportation, managing medications and managing finances) [FreeTextEntry3] : 1 living

## 2022-07-15 NOTE — ASSESSMENT
[FreeTextEntry1] : # Bacterial pneumonia\par s/p antibiotic course\par increase hydration as marlin\par stagger activity\par adequate nutrition\par perform deep breathing exercises\par \par # HTN\par Controlled.\par Continue current medications. No change in management.\par Discussed DASH diet and dietary sodium restrictions.\par Continue/Increase dietary efforts and physical activity.\par

## 2022-07-15 NOTE — COUNSELING
[Fall prevention counseling provided] : Fall prevention counseling provided [Use proper foot wear] : Use proper foot wear [Engage in a relaxing activity] : Engage in a relaxing activity

## 2022-07-15 NOTE — PLAN
[FreeTextEntry1] : Patient/family was informed about NP’s role/ STARS program and overview of transitional care reviewed with patient. Patient/family educated on topics of importance such as compliance with all provider visits, prescribed medication regimen, and low salt / heart healthy diet. Patient/Family encouraged calling NP with any issues, concerns or questions, also educated to notify NP if experiencing CP, SOB , cough, increased mucus/phlegm production, abdominal discomfort/swelling, difficulty sleeping or lying flat, fever, chills, fatigue, weight gain of 2-3lbs in 24 hours or 5lbs in one week, dizziness, lightheadedness, n/v/d/c, swelling to extremities and/or any c/o or concerns. Reassurance provided.

## 2022-07-15 NOTE — PHYSICAL EXAM
[No Acute Distress] : no acute distress [Well-Appearing] : well-appearing [Normal Voice/Communication] : normal voice/communication [Appears Younger] : appears younger than the stated age [Normal Sclera/Conjunctiva] : normal sclera/conjunctiva [PERRL] : pupils equal round and reactive to light [EOMI] : extraocular movements intact [Normal Outer Ear/Nose] : the outer ears and nose were normal in appearance [No JVD] : no jugular venous distention [Supple] : supple [No Respiratory Distress] : no respiratory distress  [Clear to Auscultation] : lungs were clear to auscultation bilaterally [No Accessory Muscle Use] : no accessory muscle use [Normal Rate] : normal rate  [Normal S1, S2] : normal S1 and S2 [Irregularly Irregular] : irregularly irregular [Pedal Pulses Present] : the pedal pulses are present [No Edema] : there was no peripheral edema [No Extremity Clubbing/Cyanosis] : no extremity clubbing/cyanosis [Soft] : abdomen soft [Non Tender] : non-tender [Non-distended] : non-distended [Normal Bowel Sounds] : normal bowel sounds [No Rash] : no rash [Normal Gait] : normal gait [Coordination Grossly Intact] : coordination grossly intact [Normal Affect] : the affect was normal [Alert and Oriented x3] : oriented to person, place, and time [Normal Insight/Judgement] : insight and judgment were intact

## 2022-07-28 ENCOUNTER — NON-APPOINTMENT (OUTPATIENT)
Age: 87
End: 2022-07-28

## 2022-11-18 ENCOUNTER — INPATIENT (INPATIENT)
Facility: HOSPITAL | Age: 87
LOS: 0 days | Discharge: HOME | DRG: 189 | End: 2022-11-19
Attending: STUDENT IN AN ORGANIZED HEALTH CARE EDUCATION/TRAINING PROGRAM | Admitting: STUDENT IN AN ORGANIZED HEALTH CARE EDUCATION/TRAINING PROGRAM
Payer: COMMERCIAL

## 2022-11-18 VITALS
RESPIRATION RATE: 20 BRPM | SYSTOLIC BLOOD PRESSURE: 123 MMHG | TEMPERATURE: 98 F | OXYGEN SATURATION: 99 % | HEIGHT: 63 IN | HEART RATE: 122 BPM | DIASTOLIC BLOOD PRESSURE: 68 MMHG | WEIGHT: 134.92 LBS

## 2022-11-18 DIAGNOSIS — Z96.649 PRESENCE OF UNSPECIFIED ARTIFICIAL HIP JOINT: Chronic | ICD-10-CM

## 2022-11-18 DIAGNOSIS — J96.01 ACUTE RESPIRATORY FAILURE WITH HYPOXIA: ICD-10-CM

## 2022-11-18 DIAGNOSIS — Z98.89 OTHER SPECIFIED POSTPROCEDURAL STATES: Chronic | ICD-10-CM

## 2022-11-18 DIAGNOSIS — Z90.710 ACQUIRED ABSENCE OF BOTH CERVIX AND UTERUS: Chronic | ICD-10-CM

## 2022-11-18 LAB
ALBUMIN SERPL ELPH-MCNC: 4.5 G/DL — SIGNIFICANT CHANGE UP (ref 3.3–5)
ALP SERPL-CCNC: 103 U/L — SIGNIFICANT CHANGE UP (ref 40–120)
ALT FLD-CCNC: 18 U/L — SIGNIFICANT CHANGE UP (ref 10–45)
ANION GAP SERPL CALC-SCNC: 16 MMOL/L — SIGNIFICANT CHANGE UP (ref 5–17)
ANISOCYTOSIS BLD QL: SLIGHT — SIGNIFICANT CHANGE UP
AST SERPL-CCNC: 26 U/L — SIGNIFICANT CHANGE UP (ref 10–40)
BASOPHILS # BLD AUTO: 0.07 K/UL — SIGNIFICANT CHANGE UP (ref 0–0.2)
BASOPHILS NFR BLD AUTO: 0.9 % — SIGNIFICANT CHANGE UP (ref 0–2)
BILIRUB SERPL-MCNC: 1.1 MG/DL — SIGNIFICANT CHANGE UP (ref 0.2–1.2)
BUN SERPL-MCNC: 8 MG/DL — SIGNIFICANT CHANGE UP (ref 7–23)
CALCIUM SERPL-MCNC: 8.9 MG/DL — SIGNIFICANT CHANGE UP (ref 8.4–10.5)
CHLORIDE SERPL-SCNC: 98 MMOL/L — SIGNIFICANT CHANGE UP (ref 96–108)
CO2 SERPL-SCNC: 21 MMOL/L — LOW (ref 22–31)
CREAT SERPL-MCNC: 0.57 MG/DL — SIGNIFICANT CHANGE UP (ref 0.5–1.3)
DACRYOCYTES BLD QL SMEAR: SLIGHT — SIGNIFICANT CHANGE UP
EGFR: 88 ML/MIN/1.73M2 — SIGNIFICANT CHANGE UP
ELLIPTOCYTES BLD QL SMEAR: SLIGHT — SIGNIFICANT CHANGE UP
EOSINOPHIL # BLD AUTO: 0 K/UL — SIGNIFICANT CHANGE UP (ref 0–0.5)
EOSINOPHIL NFR BLD AUTO: 0 % — SIGNIFICANT CHANGE UP (ref 0–6)
GLUCOSE SERPL-MCNC: 140 MG/DL — HIGH (ref 70–99)
HCT VFR BLD CALC: 34.1 % — LOW (ref 34.5–45)
HGB BLD-MCNC: 11.3 G/DL — LOW (ref 11.5–15.5)
LYMPHOCYTES # BLD AUTO: 0.59 K/UL — LOW (ref 1–3.3)
LYMPHOCYTES # BLD AUTO: 7.3 % — LOW (ref 13–44)
MACROCYTES BLD QL: SLIGHT — SIGNIFICANT CHANGE UP
MAGNESIUM SERPL-MCNC: 1.5 MG/DL — LOW (ref 1.6–2.6)
MANUAL SMEAR VERIFICATION: SIGNIFICANT CHANGE UP
MCHC RBC-ENTMCNC: 31.6 PG — SIGNIFICANT CHANGE UP (ref 27–34)
MCHC RBC-ENTMCNC: 33.1 GM/DL — SIGNIFICANT CHANGE UP (ref 32–36)
MCV RBC AUTO: 95.3 FL — SIGNIFICANT CHANGE UP (ref 80–100)
MONOCYTES # BLD AUTO: 0 K/UL — SIGNIFICANT CHANGE UP (ref 0–0.9)
MONOCYTES NFR BLD AUTO: 0 % — LOW (ref 2–14)
MYELOCYTES NFR BLD: 0.9 % — HIGH (ref 0–0)
NEUTROPHILS # BLD AUTO: 7.32 K/UL — SIGNIFICANT CHANGE UP (ref 1.8–7.4)
NEUTROPHILS NFR BLD AUTO: 85.4 % — HIGH (ref 43–77)
NEUTS BAND # BLD: 5.5 % — SIGNIFICANT CHANGE UP (ref 0–8)
NRBC # BLD: 2 /100 — HIGH (ref 0–0)
NT-PROBNP SERPL-SCNC: 3153 PG/ML — HIGH (ref 0–300)
OVALOCYTES BLD QL SMEAR: SLIGHT — SIGNIFICANT CHANGE UP
PHOSPHATE SERPL-MCNC: 3.5 MG/DL — SIGNIFICANT CHANGE UP (ref 2.5–4.5)
PLAT MORPH BLD: ABNORMAL
PLATELET # BLD AUTO: 235 K/UL — SIGNIFICANT CHANGE UP (ref 150–400)
POIKILOCYTOSIS BLD QL AUTO: SLIGHT — SIGNIFICANT CHANGE UP
POLYCHROMASIA BLD QL SMEAR: SLIGHT — SIGNIFICANT CHANGE UP
POTASSIUM SERPL-MCNC: 3.8 MMOL/L — SIGNIFICANT CHANGE UP (ref 3.5–5.3)
POTASSIUM SERPL-SCNC: 3.8 MMOL/L — SIGNIFICANT CHANGE UP (ref 3.5–5.3)
PROT SERPL-MCNC: 7.7 G/DL — SIGNIFICANT CHANGE UP (ref 6–8.3)
RAPID RVP RESULT: DETECTED
RBC # BLD: 3.58 M/UL — LOW (ref 3.8–5.2)
RBC # FLD: 23.4 % — HIGH (ref 10.3–14.5)
RBC BLD AUTO: ABNORMAL
RSV RNA SPEC QL NAA+PROBE: DETECTED
SARS-COV-2 RNA SPEC QL NAA+PROBE: SIGNIFICANT CHANGE UP
SODIUM SERPL-SCNC: 135 MMOL/L — SIGNIFICANT CHANGE UP (ref 135–145)
TROPONIN T, HIGH SENSITIVITY RESULT: 16 NG/L — SIGNIFICANT CHANGE UP (ref 0–51)
TROPONIN T, HIGH SENSITIVITY RESULT: 19 NG/L — SIGNIFICANT CHANGE UP (ref 0–51)
WBC # BLD: 8.05 K/UL — SIGNIFICANT CHANGE UP (ref 3.8–10.5)
WBC # FLD AUTO: 8.05 K/UL — SIGNIFICANT CHANGE UP (ref 3.8–10.5)

## 2022-11-18 PROCEDURE — 93010 ELECTROCARDIOGRAM REPORT: CPT

## 2022-11-18 PROCEDURE — 99285 EMERGENCY DEPT VISIT HI MDM: CPT | Mod: CS

## 2022-11-18 PROCEDURE — 71045 X-RAY EXAM CHEST 1 VIEW: CPT | Mod: 26

## 2022-11-18 RX ORDER — MAGNESIUM OXIDE 400 MG ORAL TABLET 241.3 MG
800 TABLET ORAL ONCE
Refills: 0 | Status: COMPLETED | OUTPATIENT
Start: 2022-11-18 | End: 2022-11-18

## 2022-11-18 RX ORDER — METOPROLOL TARTRATE 50 MG
50 TABLET ORAL DAILY
Refills: 0 | Status: DISCONTINUED | OUTPATIENT
Start: 2022-11-18 | End: 2022-11-19

## 2022-11-18 RX ORDER — FUROSEMIDE 40 MG
20 TABLET ORAL ONCE
Refills: 0 | Status: COMPLETED | OUTPATIENT
Start: 2022-11-18 | End: 2022-11-18

## 2022-11-18 RX ORDER — IPRATROPIUM/ALBUTEROL SULFATE 18-103MCG
3 AEROSOL WITH ADAPTER (GRAM) INHALATION ONCE
Refills: 0 | Status: COMPLETED | OUTPATIENT
Start: 2022-11-18 | End: 2022-11-18

## 2022-11-18 RX ADMIN — Medication 50 MILLIGRAM(S): at 17:53

## 2022-11-18 RX ADMIN — Medication 3 MILLILITER(S): at 17:53

## 2022-11-18 RX ADMIN — MAGNESIUM OXIDE 400 MG ORAL TABLET 800 MILLIGRAM(S): 241.3 TABLET ORAL at 19:33

## 2022-11-18 RX ADMIN — Medication 20 MILLIGRAM(S): at 19:34

## 2022-11-18 NOTE — ED PROVIDER NOTE - PHYSICAL EXAMINATION
Vitals: I have reviewed the patients vital signs  General: uncomfortable appearing  HEENT: Atraumatic, normocephalic, airway patent, hoarse voice  Eyes: R eye chemosis, surgical pupil  Neck: no tracheal deviation, no JVD  Chest/Lungs: no trauma, symmetric chest rise, speaking in complete sentences, mild WOB, rhonchi   Heart: skin and extremities well perfused, tachy  Neuro: A+Ox3, ambulating without difficulty, CN grossly intact  MSK: strength at baseline in all extremities, no muscle wasting or atrophy  Skin: no cyanosis, no jaundice, no new emergent lesions

## 2022-11-18 NOTE — ED PROVIDER NOTE - CARE PLAN
Principal Discharge DX:	Respiratory syncytial virus (RSV)   1 Principal Discharge DX:	Acute respiratory failure with hypoxia  Secondary Diagnosis:	RSV infection

## 2022-11-18 NOTE — ED PROVIDER NOTE - NSFOLLOWUPINSTRUCTIONS_ED_ALL_ED_FT
Upper Respiratory Infection, Adult  An upper respiratory infection (URI) is a common viral infection of the nose, throat, and upper air passages that lead to the lungs. The most common type of URI is the common cold. URIs usually get better on their own, without medical treatment.    What are the causes?  A URI is caused by a virus. You may catch a virus by: Breathing in droplets from an infected person's cough or sneeze. Touching something that has been exposed to the virus (contaminated) and then touching your mouth, nose, or eyes.    What increases the risk? You are more likely to get a URI if:  You are very young or very old.It is miguel or winter. You have close contact with others, such as at a , school, or health care facility. You smoke. You have long-term (chronic) heart or lung disease. You have a weakened disease-fighting (immune) system. You have nasal allergies or asthma. You are experiencing a lot of stress. You work in an area that has poor air circulation. You have poor nutrition.    What are the signs or symptoms? A URI usually involves some of the following symptoms:  Runny or stuffy (congested) nose. Sneezing. Cough. Sore throat. Headache. Fatigue. Fever. Loss of appetite. Pain in your forehead, behind your eyes, and over your cheekbones (sinus pain). Muscle aches. Redness or irritation of the eyes. Pressure in the ears or face.    How is this diagnosed?  This condition may be diagnosed based on your medical history and symptoms, and a physical exam. Your health care provider may use a cotton swab to take a mucus sample from your nose (nasal swab). This sample can be tested to determine what virus is causing the illness.    How is this treated?  URIs usually get better on their own within 7–10 days. You can take steps at home to relieve your symptoms. Medicines cannot cure URIs, but your health care provider may recommend certain medicines to help relieve symptoms, such as: Over-the-counter cold medicines. Cough suppressants. Coughing is a type of defense against infection that helps to clear the respiratory system, so take these medicines only as recommended by your health care provider. Fever-reducing medicines.    Follow these instructions at home:  Activity: Rest as needed. If you have a fever, stay home from work or school until your fever is gone or until your health care provider says you are no longer contagious. Your health care provider may have you wear a face mask to prevent your infection from spreading.  Relieving symptoms   Gargle with a salt-water mixture 3–4 times a day or as needed. To make a salt-water mixture, completely dissolve ½–1 tsp of salt in 1 cup of warm water. Use a cool-mist humidifier to add moisture to the air. This can help you breathe more easily.  Eating and drinking   Drink enough fluid to keep your urine pale yellow. Eat soups and other clear broths.    General instructions:  Take over-the-counter and prescription medicines only as told by your health care provider. These include cold medicines, fever reducers, and cough suppressants. Do not use any products that contain nicotine or tobacco, such as cigarettes and e-cigarettes. If you need help quitting, ask your health care provider. Stay away from secondhand smoke. Stay up to date on all immunizations, including the yearly (annual) flu vaccine. Keep all follow-up visits as told by your health care provider. This is important.  How to prevent the spread of infection to others:  URIs can be passed from person to person (are contagious). To prevent the infection from spreading:  Wash your hands often with soap and water. If soap and water are not available, use hand . Avoid touching your mouth, face, eyes, or nose. Cough or sneeze into a tissue or your sleeve or elbow instead of into your hand or into the air.    Contact a health care provider if:  You are getting worse instead of better. You have a fever or chills. Your mucus is brown or red. You have yellow or brown discharge coming from your nose. You have pain in your face, especially when you bend forward. You have swollen neck glands. You have pain while swallowing. You have white areas in the back of your throat.  Get help right away if:  You have shortness of breath that gets worse. You have severe or persistent:  Headache. Ear pain. Sinus pain. Chest pain. You have chronic lung disease along with any of the following:  Wheezing. Prolonged cough. Coughing up blood. A change in your usual mucus. You have a stiff neck. You have changes in your: Vision. Hearing. Thinking. Mood.     Summary  An upper respiratory infection (URI) is a common infection of the nose, throat, and upper air passages that lead to the lungs. A URI is caused by a virus. URIs usually get better on their own within 7–10 days. Medicines cannot cure URIs, but your health care provider may recommend certain medicines to help relieve symptoms. This information is not intended to replace advice given to you by your health care provider. Make sure you discuss any questions you have with your health care provider.

## 2022-11-18 NOTE — ED PROVIDER NOTE - PATIENT PORTAL LINK FT
You can access the FollowMyHealth Patient Portal offered by [facility] by registering at the following website: [email or web address]. By joining [facility]’s FollowMyHealth portal, you will also be able to view your health information using other applications (apps) compatible with our system.

## 2022-11-18 NOTE — ED PROVIDER NOTE - NSICDXPASTMEDICALHX_GEN_ALL_CORE_FT
PAST MEDICAL HISTORY:  Afib     Anemia, unspecified type     Chronic midline low back pain without sciatica     COVID     Hypertension     Myelodysplastic syndrome     Overactive bladder     Primary osteoarthritis of both hips     Uterine leiomyoma, unspecified location

## 2022-11-18 NOTE — ED ADULT NURSE NOTE - OBJECTIVE STATEMENT
87y female, AAOx4, pt presents to ED complaining of several weeks of worsening on and off URI symptoms, cough, congestion, malaise and fatigue.  She went to urgent care today was found to be in A. fib with RVR, diagnosed with pneumonia, given 2 shots per daughter though they are not sure what, they said 1 was an antibiotic and one was for the heart.  Sent to the ED from urgent care.  Patient feels short of breath, but does not have pain in her chest.  Is tolerating p.o. no abdominal pain or vomiting.  Has also recently noticed that her right eye is a bit swollen. 20g placed right ac, labs drawn, bed in lowest position, comfort and safety provided.

## 2022-11-18 NOTE — ED PROVIDER NOTE - CLINICAL SUMMARY MEDICAL DECISION MAKING FREE TEXT BOX
No female with a history of A. fib on DOAC and beta-blocker presents with several weeks of worsening viral type symptoms, with cough congestion and shortness of breath.  She is in A. fib with RVR, borderline hypoxic on room air rhonchorous and uncomfortable appearing.  Her right eye is chemotic without coretta purulence or concerning exam findings.  Her symptoms are consistent with viral pneumonia versus bacterial pneumonia, will get chest x-ray, BNP to evaluate for heart failure as the patient is on Lasix and is unsure why.  We will need to ambulate her and see what her room air saturation becomes as she is borderline hypoxic just sitting still.  If she requires oxygen will require admission.  Based on her history and age if she does have pneumonia she may warrant admission based on her curb 65 score. No reason to suspect acs, PE.

## 2022-11-18 NOTE — ED ADULT TRIAGE NOTE - GLASGOW COMA SCALE: SCORE, MLM
Discussed condition and exacerbating conditions/situations (e.g., dry/arid environments, overhead fans, air conditioners, side effect of medications). 15

## 2022-11-18 NOTE — ED PROVIDER NOTE - PROGRESS NOTE DETAILS
Attending (Hank Gann D.O.):  Labs nonactionable but patient now requiring o2, 4L Nc to mainta sat > 92%. All w/u, results discussed with patient and family. PCP Dr. Bull Zuñiga ([facility]). Admit.

## 2022-11-18 NOTE — ED PROVIDER NOTE - OBJECTIVE STATEMENT
Patient is an 87-year-old female with history of A. fib on Eliquis and metoprolol (did not take today), on Lasix unclear why does not endorse a history of heart failure.  The patient is here with several weeks of worsening on and off URI symptoms, cough, congestion, malaise and fatigue.  She went to urgent care today was found to be in A. fib with RVR, diagnosed with pneumonia, given 2 shots per daughter though they are not sure what, they said 1 was an antibiotic and one was for the heart.  Sent to the ED from urgent care.  Patient feels short of breath, but does not have pain in her chest.  Is tolerating p.o. no abdominal pain or vomiting.  Has also recently noticed that her right eye is a bit swollen. She is s/p bilateral Lasic surgery

## 2022-11-18 NOTE — ED PROVIDER NOTE - NS ED ROS FT
Constitutional: (-) vomiting  Eyes/ENT: (-) vision changes, (-) hearing changes  Cardiovascular: (-) chest pain, (+) wheezing  Respiratory: (+) cough, (+) shortness of breath  Gastrointestinal: (-) vomiting, (-) diarrhea, (-) abdominal pain  : (-) dysuria   Musculoskeletal: (-) back pain  Integumentary: (-) rash, (-) edema  Neurological: (-)loc  Allergic/Immunologic: (-) pruritus  Endocrine: No history of thyroid disease

## 2022-11-18 NOTE — ED PROVIDER NOTE - ATTENDING CONTRIBUTION TO CARE
Attending (Hank Gann D.O.):  I have personally seen and examined this patient. I have performed a substantive portion of the visit including all aspects of the medical decision making. Resident, fellow, student, and/or ACP note reviewed. I agree on the plan of care except where noted.    87F hx of afib on lopressor and doac here after urgent care center said patient has early pneumonia and ref to ED. Patient with 3 weeks of URI sxs with shortness of breath, right eye clear drainage w/o vision changes. Denies chest pain. Missed morning BB dose today. Denies fevers. Currently patient in afib with intermittent RVR from 100 to 150. Lungs with rhoncus sounds and scant exp wheezes. +slight pitting edema in bilateral LE. Benign abdomen. PERRL, EOMI. No purulence expressed. Suspect afib with rvr 2/2 lack of am beta blocker. Other sxs sound more consistent with viral etiology. Given high heart rate, and question hx of heart failure and questionable hx of needing lasix, max be superimposed component of slight fluid overload. Will need further risk stratification. Check labs, cardiac biomarkers,, EKG, CXR, place on cardiac monitor for telemetry monitoring. Trial neg. Give BB dose.

## 2022-11-19 ENCOUNTER — TRANSCRIPTION ENCOUNTER (OUTPATIENT)
Age: 87
End: 2022-11-19

## 2022-11-19 VITALS — DIASTOLIC BLOOD PRESSURE: 67 MMHG | OXYGEN SATURATION: 97 % | HEART RATE: 97 BPM | SYSTOLIC BLOOD PRESSURE: 126 MMHG

## 2022-11-19 DIAGNOSIS — I48.0 PAROXYSMAL ATRIAL FIBRILLATION: ICD-10-CM

## 2022-11-19 DIAGNOSIS — J12.1 RESPIRATORY SYNCYTIAL VIRUS PNEUMONIA: ICD-10-CM

## 2022-11-19 DIAGNOSIS — B33.8 OTHER SPECIFIED VIRAL DISEASES: ICD-10-CM

## 2022-11-19 DIAGNOSIS — J96.01 ACUTE RESPIRATORY FAILURE WITH HYPOXIA: ICD-10-CM

## 2022-11-19 LAB
ALBUMIN SERPL ELPH-MCNC: 4.1 G/DL — SIGNIFICANT CHANGE UP (ref 3.3–5)
ALP SERPL-CCNC: 87 U/L — SIGNIFICANT CHANGE UP (ref 40–120)
ALT FLD-CCNC: 14 U/L — SIGNIFICANT CHANGE UP (ref 10–45)
ANION GAP SERPL CALC-SCNC: 13 MMOL/L — SIGNIFICANT CHANGE UP (ref 5–17)
AST SERPL-CCNC: 24 U/L — SIGNIFICANT CHANGE UP (ref 10–40)
BILIRUB SERPL-MCNC: 1 MG/DL — SIGNIFICANT CHANGE UP (ref 0.2–1.2)
BUN SERPL-MCNC: 15 MG/DL — SIGNIFICANT CHANGE UP (ref 7–23)
CALCIUM SERPL-MCNC: 8.6 MG/DL — SIGNIFICANT CHANGE UP (ref 8.4–10.5)
CHLORIDE SERPL-SCNC: 101 MMOL/L — SIGNIFICANT CHANGE UP (ref 96–108)
CO2 SERPL-SCNC: 22 MMOL/L — SIGNIFICANT CHANGE UP (ref 22–31)
CREAT SERPL-MCNC: 0.63 MG/DL — SIGNIFICANT CHANGE UP (ref 0.5–1.3)
D DIMER BLD IA.RAPID-MCNC: 410 NG/ML DDU — HIGH
EGFR: 86 ML/MIN/1.73M2 — SIGNIFICANT CHANGE UP
GLUCOSE SERPL-MCNC: 150 MG/DL — HIGH (ref 70–99)
HCT VFR BLD CALC: 30.2 % — LOW (ref 34.5–45)
HGB BLD-MCNC: 9.7 G/DL — LOW (ref 11.5–15.5)
MAGNESIUM SERPL-MCNC: 1.6 MG/DL — SIGNIFICANT CHANGE UP (ref 1.6–2.6)
MCHC RBC-ENTMCNC: 31.2 PG — SIGNIFICANT CHANGE UP (ref 27–34)
MCHC RBC-ENTMCNC: 32.1 GM/DL — SIGNIFICANT CHANGE UP (ref 32–36)
MCV RBC AUTO: 97.1 FL — SIGNIFICANT CHANGE UP (ref 80–100)
NRBC # BLD: 2 /100 WBCS — HIGH (ref 0–0)
PHOSPHATE SERPL-MCNC: 3.5 MG/DL — SIGNIFICANT CHANGE UP (ref 2.5–4.5)
PLATELET # BLD AUTO: 199 K/UL — SIGNIFICANT CHANGE UP (ref 150–400)
POTASSIUM SERPL-MCNC: 3.5 MMOL/L — SIGNIFICANT CHANGE UP (ref 3.5–5.3)
POTASSIUM SERPL-SCNC: 3.5 MMOL/L — SIGNIFICANT CHANGE UP (ref 3.5–5.3)
PROT SERPL-MCNC: 6.8 G/DL — SIGNIFICANT CHANGE UP (ref 6–8.3)
RBC # BLD: 3.11 M/UL — LOW (ref 3.8–5.2)
RBC # FLD: 24.3 % — HIGH (ref 10.3–14.5)
SODIUM SERPL-SCNC: 136 MMOL/L — SIGNIFICANT CHANGE UP (ref 135–145)
TROPONIN T, HIGH SENSITIVITY RESULT: 17 NG/L — SIGNIFICANT CHANGE UP (ref 0–51)
TSH SERPL-MCNC: 0.33 UIU/ML — SIGNIFICANT CHANGE UP (ref 0.27–4.2)
WBC # BLD: 3.65 K/UL — LOW (ref 3.8–10.5)
WBC # FLD AUTO: 3.65 K/UL — LOW (ref 3.8–10.5)

## 2022-11-19 PROCEDURE — 71045 X-RAY EXAM CHEST 1 VIEW: CPT

## 2022-11-19 PROCEDURE — 83735 ASSAY OF MAGNESIUM: CPT

## 2022-11-19 PROCEDURE — 84443 ASSAY THYROID STIM HORMONE: CPT

## 2022-11-19 PROCEDURE — 97161 PT EVAL LOW COMPLEX 20 MIN: CPT

## 2022-11-19 PROCEDURE — 83880 ASSAY OF NATRIURETIC PEPTIDE: CPT

## 2022-11-19 PROCEDURE — 85379 FIBRIN DEGRADATION QUANT: CPT

## 2022-11-19 PROCEDURE — 99285 EMERGENCY DEPT VISIT HI MDM: CPT

## 2022-11-19 PROCEDURE — 36415 COLL VENOUS BLD VENIPUNCTURE: CPT

## 2022-11-19 PROCEDURE — 85025 COMPLETE CBC W/AUTO DIFF WBC: CPT

## 2022-11-19 PROCEDURE — 84100 ASSAY OF PHOSPHORUS: CPT

## 2022-11-19 PROCEDURE — 96374 THER/PROPH/DIAG INJ IV PUSH: CPT

## 2022-11-19 PROCEDURE — 0225U NFCT DS DNA&RNA 21 SARSCOV2: CPT

## 2022-11-19 PROCEDURE — 84484 ASSAY OF TROPONIN QUANT: CPT

## 2022-11-19 PROCEDURE — 99223 1ST HOSP IP/OBS HIGH 75: CPT

## 2022-11-19 PROCEDURE — 94640 AIRWAY INHALATION TREATMENT: CPT

## 2022-11-19 PROCEDURE — 80053 COMPREHEN METABOLIC PANEL: CPT

## 2022-11-19 RX ORDER — FUROSEMIDE 40 MG
1 TABLET ORAL
Qty: 0 | Refills: 0 | DISCHARGE

## 2022-11-19 RX ORDER — BUDESONIDE, MICRONIZED 100 %
0.5 POWDER (GRAM) MISCELLANEOUS
Refills: 0 | Status: DISCONTINUED | OUTPATIENT
Start: 2022-11-19 | End: 2022-11-19

## 2022-11-19 RX ORDER — METOPROLOL TARTRATE 50 MG
1 TABLET ORAL
Qty: 0 | Refills: 0 | DISCHARGE

## 2022-11-19 RX ORDER — SODIUM CHLORIDE 0.65 %
1 AEROSOL, SPRAY (ML) NASAL
Qty: 0 | Refills: 0 | DISCHARGE
Start: 2022-11-19

## 2022-11-19 RX ORDER — ALBUTEROL 90 UG/1
2 AEROSOL, METERED ORAL
Qty: 17 | Refills: 0
Start: 2022-11-19 | End: 2022-12-18

## 2022-11-19 RX ORDER — SODIUM CHLORIDE 0.65 %
1 AEROSOL, SPRAY (ML) NASAL
Qty: 120 | Refills: 0
Start: 2022-11-19 | End: 2022-12-18

## 2022-11-19 RX ORDER — IPRATROPIUM BROMIDE 0.2 MG/ML
500 SOLUTION, NON-ORAL INHALATION EVERY 6 HOURS
Refills: 0 | Status: DISCONTINUED | OUTPATIENT
Start: 2022-11-19 | End: 2022-11-19

## 2022-11-19 RX ORDER — APIXABAN 2.5 MG/1
5 TABLET, FILM COATED ORAL
Refills: 0 | Status: DISCONTINUED | OUTPATIENT
Start: 2022-11-19 | End: 2022-11-19

## 2022-11-19 RX ORDER — ACETYLCYSTEINE 200 MG/ML
4 VIAL (ML) MISCELLANEOUS
Refills: 0 | Status: DISCONTINUED | OUTPATIENT
Start: 2022-11-19 | End: 2022-11-19

## 2022-11-19 RX ORDER — SODIUM CHLORIDE 0.65 %
1 AEROSOL, SPRAY (ML) NASAL EVERY 6 HOURS
Refills: 0 | Status: DISCONTINUED | OUTPATIENT
Start: 2022-11-19 | End: 2022-11-19

## 2022-11-19 RX ORDER — APIXABAN 2.5 MG/1
1 TABLET, FILM COATED ORAL
Qty: 0 | Refills: 0 | DISCHARGE

## 2022-11-19 RX ADMIN — Medication 0.5 MILLIGRAM(S): at 05:45

## 2022-11-19 RX ADMIN — Medication 1 SPRAY(S): at 05:45

## 2022-11-19 RX ADMIN — Medication 500 MICROGRAM(S): at 11:38

## 2022-11-19 RX ADMIN — Medication 1 SPRAY(S): at 11:38

## 2022-11-19 RX ADMIN — Medication 4 MILLILITER(S): at 05:45

## 2022-11-19 RX ADMIN — APIXABAN 5 MILLIGRAM(S): 2.5 TABLET, FILM COATED ORAL at 05:39

## 2022-11-19 RX ADMIN — Medication 50 MILLIGRAM(S): at 05:39

## 2022-11-19 RX ADMIN — Medication 500 MICROGRAM(S): at 05:46

## 2022-11-19 NOTE — H&P ADULT - NSHPREVIEWOFSYSTEMS_GEN_ALL_CORE
REVIEW OF SYSTEMS:  CONSTITUTIONAL: +weakness. No fevers. No chills. No rigors. No poor appetite.  EYES: No blurry or double vision. No eye pain.  ENT: No hearing difficulty. No sore throat. +Sinusitis/rhinorrhea.   NECK: No pain. No stiffness/rigidity.  CARDIAC: No chest pain. No palpitations. No lightheadedness. No syncope.  RESPIRATORY: +cough. +SOB.   GASTROINTESTINAL: No abdominal pain. No nausea. No vomiting. +diarrhea. No constipation.   GENITOURINARY: No dysuria. No frequency. No oliguria.  NEUROLOGICAL: No numbness/tingling. No focal weakness. +headache. No unsteady gait.  BACK: No back pain. No flank pain.  EXTREMITIES: No lower extremity edema. Full ROM. No joint pain.  SKIN: No rashes. No itching. No other lesions.  PSYCHIATRIC: No depression. No anxiety.   ALLERGIC: No lip swelling. No hives.  All other review of systems is negative unless indicated above.  Unless indicated above, unable to assess ROS 2/2

## 2022-11-19 NOTE — DISCHARGE NOTE PROVIDER - NSDCMRMEDTOKEN_GEN_ALL_CORE_FT
Eliquis 5 mg oral tablet: 1 tab(s) orally 2 times a day  Lasix 40 mg oral tablet: 1 tab(s) orally once a day, As Needed  metoprolol succinate 50 mg oral tablet, extended release: 1 tab(s) orally once a day  sodium chloride 0.65% nasal spray: 1 spray(s) nasal every 6 hours, As Needed   albuterol 90 mcg/inh inhalation aerosol: 2 puff(s) inhaled every 6 hours, As Needed -for shortness of breath and/or wheezing   Eliquis 5 mg oral tablet: 1 tab(s) orally 2 times a day  Lasix 40 mg oral tablet: 1 tab(s) orally once a day, As Needed  metoprolol succinate 50 mg oral tablet, extended release: 1 tab(s) orally once a day  sodium chloride 0.65% nasal spray: 1 spray(s) nasal every 6 hours, As Needed -for congestion

## 2022-11-19 NOTE — PHYSICAL THERAPY INITIAL EVALUATION ADULT - ADDITIONAL COMMENTS
Patient reports she lives alone in a private house. She has 3 steps to enter and 14 steps inside. She reports being fully independent prior.

## 2022-11-19 NOTE — DISCHARGE NOTE NURSING/CASE MANAGEMENT/SOCIAL WORK - NSDCPEFALRISK_GEN_ALL_CORE
For information on Fall & Injury Prevention, visit: [email or web address] OR  [email or web address] OR  https://www.cdc.gov/steadi/patient.html

## 2022-11-19 NOTE — H&P ADULT - NSHPLABSRESULTS_GEN_ALL_CORE
Personally reviewed old records.  Personally reviewed labs.  Personally reviewed imaging.                            11.3   8.05  )-----------( 235      ( 18 Nov 2022 18:14 )             34.1       11-18    135  |  98  |  8   ----------------------------<  140<H>  3.8   |  21<L>  |  0.57    Ca    8.9      18 Nov 2022 18:14  Phos  3.5     11-18  Mg     1.5     11-18    TPro  7.7  /  Alb  4.5  /  TBili  1.1  /  DBili  x   /  AST  26  /  ALT  18  /  AlkPhos  103  11-18            LIVER FUNCTIONS - ( 18 Nov 2022 18:14 )  Alb: 4.5 g/dL / Pro: 7.7 g/dL / ALK PHOS: 103 U/L / ALT: 18 U/L / AST: 26 U/L / GGT: x

## 2022-11-19 NOTE — DISCHARGE NOTE PROVIDER - NSDCCPCAREPLAN_GEN_ALL_CORE_FT
PRINCIPAL DISCHARGE DIAGNOSIS  Diagnosis: Acute respiratory failure with hypoxia  Assessment and Plan of Treatment: Resolved  no evidence of pulmonary edema or PNA        SECONDARY DISCHARGE DIAGNOSES  Diagnosis: RSV infection  Assessment and Plan of Treatment: Get plenty of rest.  Drink enough water and fluids.  Eat a well-balanced diet.  Call your caregiver for follow-up as recommended.      Diagnosis: Paroxysmal atrial fibrillation with RVR  Assessment and Plan of Treatment: Atrial fibrillation is the most common heart rhythm problem.  The condition puts you at risk for has stroke and heart attack  It helps if you control your blood pressure, not drink more than 1-2 alcohol drinks per day, cut down on caffeine, getting treatment for over active thyroid gland, and get regular exercise  Call your doctor if you feel your heart racing or beating unusually, chest tightness or pain, lightheaded, faint, shortness of breath especially with exercise  It is important to take your heart medication as prescribed  You may be on anticoagulation which is very important to take as directed - you may need blood work to monitor drug levels

## 2022-11-19 NOTE — H&P ADULT - NSHPPHYSICALEXAM_GEN_ALL_CORE
PHYSICAL EXAM:   GENERAL: Alert. Not confused. No acute distress. Not thin. Not cachectic. Not obese.  HEAD:  Atraumatic. Normocephalic.  EYES: EOMI. PERRLA. Mild right scleral injection.   ENT: Neck supple. No JVD. Moist oral mucosa. Not edentulous. No thrush.  LYMPH: Normal supraclavicular/cervical lymph nodes.   CARDIAC: Not tachy, Not brii. Regular rhythm. Not irregularly irregular. S1. S2. No murmur. No rub. No distant heart sounds.  LUNG/CHEST: CTAB. BS equal bilaterally. No wheezes. No rales. No rhonchi.  ABDOMEN: Soft. No tenderness. No distension. No fluid wave. Normal bowel sounds.  BACK: No midline/vertebral tenderness. No flank tenderness.  VASCULAR: +2 b/l radial or ulnar pulses. Palpable DP pulses.  EXTREMITIES:  No clubbing. No cyanosis. No edema. Moving all 4.  NEUROLOGY: A&Ox3. Non-focal exam. Cranial nerves intact. Normal speech. Sensation intact.  PSYCH: Normal behavior. Normal affect.  SKIN: No jaundice. No erythema. No rash/lesion.  ICU Vital Signs Last 24 Hrs  T(C): 36.6 (19 Nov 2022 00:26), Max: 36.6 (18 Nov 2022 15:16)  T(F): 97.8 (19 Nov 2022 00:26), Max: 97.9 (18 Nov 2022 15:16)  HR: 101 (19 Nov 2022 00:26) (99 - 140)  BP: 133/89 (19 Nov 2022 00:26) (112/70 - 141/55)  BP(mean): --  ABP: --  ABP(mean): --  RR: 20 (19 Nov 2022 00:26) (20 - 24)  SpO2: 95% (19 Nov 2022 00:26) (92% - 99%)    O2 Parameters below as of 19 Nov 2022 00:26  Patient On (Oxygen Delivery Method): nasal cannula  O2 Flow (L/min): 2        I&O's Summary

## 2022-11-19 NOTE — DISCHARGE NOTE PROVIDER - ATTENDING DISCHARGE PHYSICAL EXAMINATION:
87F PMH afib on Eliquis and metoprolol, COVID infection presents with several weeks of URI symptoms and fatigue. Patient was seen at urgent care after missing a dose of her metoprolol, and was found to have afib with RVR. Patient sent in to ED. Patient hemodynamically stable in ED, and weaned off supplemental O2. No needs for inpatient admission. Discharged to follow up with her cardiologist as an outpatient.     PHYSICAL EXAM:  Constitutional: WDWN resting comfortably in bed; NAD  Head: NC/AT  Eyes: PERRL, EOMI, anicteric sclera  ENT: no nasal discharge; uvula midline, no oropharyngeal erythema or exudates; MMM  Neck: supple; no JVD or thyromegaly  Respiratory: CTA B/L; no W/R/R, no retractions  Cardiac: +S1/S2; RRR; no M/R/G; PMI non-displaced  Gastrointestinal: abdomen soft, NT/ND; no rebound or guarding; +BSx4  Back: spine midline, no bony tenderness or step-offs; no CVAT B/L  Extremities: WWP, no clubbing or cyanosis; no peripheral edema  Musculoskeletal: NROM x4; no joint swelling, tenderness or erythema  Vascular: 2+ radial, femoral, DP/PT pulses B/L  Dermatologic: skin warm, dry and intact; no rashes, wounds, or scars  Lymphatic: no submandibular or cervical LAD  Neurologic: AAOx3; CNII-XII grossly intact; no focal deficits  Psychiatric: affect and characteristics of appearance, verbalizations, behaviors are appropriate

## 2022-11-19 NOTE — H&P ADULT - HISTORY OF PRESENT ILLNESS
87F c hx afib on eliquis, MDS, overactive bladder, chronic SOB 2/2 covid, chronic diarrhea, pw several weeks of cough and worsening SOB.    Pt reports 2 weeks of dry cough and worsening SOB, with reduced exercise tolerance. Pt also reports mild headache, sinus congestion, right eye swelling. Pt denies fevers, chills, chest pain, abd pain. Pt denies leg swelling, calf tenderness. Pt takes lasix only "occasionally".

## 2022-11-19 NOTE — H&P ADULT - NSHPSOCIALHISTORY_GEN_ALL_CORE
Social History:    Marital Status: (  ) , (  ) Single, (  ) , ( x ) , (  )   # of Children: 1 daughter  Lives with: ( x ) alone, (  ) children, (  ) spouse, (  ) parents, (  ) siblings, (  ) friends, (  ) other:   Occupation:     Substance Use/Illicit Drugs: (  ) never used vs other:   Tobacco Usage: ( x ) never smoked, (  ) former smoker, (  ) current smoker and Total Pack-Years:   Last Alcohol Usage/Frequency/Amount/Withdrawal/Hx of Abuse:  glass of wine 1 week ago  Foreign travel:   Animal exposure:

## 2022-11-19 NOTE — H&P ADULT - PROBLEM SELECTOR PLAN 1
- unclear etiol  - no evidence of pulm edema or PNA  - will cont with nebs, chest pt, inhaled steroid and mucomyst  - d-dimer

## 2022-11-19 NOTE — DISCHARGE NOTE NURSING/CASE MANAGEMENT/SOCIAL WORK - NSDCPETBCESMAN_GEN_ALL_CORE
If you are a smoker, it is important for your health to stop smoking. Please be aware that second hand smoke is also harmful.
178

## 2022-11-19 NOTE — H&P ADULT - ASSESSMENT
87F c hx afib on eliquis, MDS, overactive bladder, chronic SOB 2/2 covid, chronic diarrhea, pw acute hypoxic resp failure and afib c rvr in setting of RSV URI.

## 2022-11-19 NOTE — DISCHARGE NOTE PROVIDER - HOSPITAL COURSE
87F c hx afib on eliquis, MDS, overactive bladder, chronic SOB 2/2 covid, chronic diarrhea, pw acute hypoxic resp failure and afib c rvr in setting of RSV URI.     Problem/Plan - 1:  ·  Problem: Acute respiratory failure with hypoxia.   ·  Plan: - unclear etiol  - no evidence of pulm edema or PNA  - will cont with nebs, chest pt, inhaled steroid and mucomyst     Problem/Plan - 2:  ·  Problem: RSV infection.   ·  Plan: - supportive management as above.     Problem/Plan - 3:  ·  Problem: Paroxysmal atrial fibrillation with RVR.   ·  Plan: - cont metoprolol  - tele.    now on RA, cleared for discharge by Dr. Butcher. 87-year-old female with history of A. fib on Eliquis and metoprolol (did not take today), on Lasix unclear why does not endorse a history of heart failure. The patient is here with several weeks of worsening on and off URI symptoms, cough, congestion, malaise and fatigue. She went to urgent care today was found to be in A. fib with RVR, diagnosed with pneumonia, given 2 shots per daughter though they are not sure what, they said 1 was an antibiotic and one was for the heart. Sent to the ED from urgent care. Patient feels short of breath, but does not have pain in her chest. Is tolerating p.o. no abdominal pain or vomiting.     pw acute hypoxic resp failure and afib c rvr in setting of RSV URI.     Problem/Plan - 1:  ·  Problem: Acute respiratory failure with hypoxia.   ·  Plan: - unclear etiol  - no evidence of pulm edema or PNA on CXR  - will cont with nebs, chest pt, inhaled steroid and mucomyst     Problem/Plan - 2:  ·  Problem: RSV infection.   ·  Plan: - supportive management as above.     Problem/Plan - 3:  ·  Problem: Paroxysmal atrial fibrillation with RVR.   ·  Plan: - cont metoprolol  - tele.    improved, now on RA, cleared for discharge by Dr. Butcher.

## 2022-11-19 NOTE — PHYSICAL THERAPY INITIAL EVALUATION ADULT - PERTINENT HX OF CURRENT PROBLEM, REHAB EVAL
87 y.o F w/ hx of  afib on eliquis, MDS, overactive bladder, chronic SOB 2/2 covid, chronic diarrhea, pw several weeks of cough and worsening SOB. Pt reports 2 weeks of dry cough and worsening SOB, with reduced exercise tolerance. Pt also reports mild headache, sinus congestion, right eye swelling. Pt denies fevers, chills, chest pain, abd pain. Patient with Acute respiratory failure with hypoxia, RSV infection, covid negative and no findings on chest x-ray.

## 2022-11-19 NOTE — DISCHARGE NOTE NURSING/CASE MANAGEMENT/SOCIAL WORK - PATIENT PORTAL LINK FT
You can access the FollowMyHealth Patient Portal offered by [facility] by registering at the following website: [email or web address]. By joining [facility] FollowMyHealth portal, you will also be able to view your health information using other applications (apps) compatible with our system.

## 2023-09-19 NOTE — REVIEW OF SYSTEMS
[Fever] : no fever [Nasal Discharge] : no nasal discharge [Vision Problems] : no vision problems [Shortness Of Breath] : no shortness of breath [Chest Pain] : no chest pain [Cough] : cough [Skin Rash] : no skin rash [Headache] : no headache Consent (Scalp)/Introductory Paragraph: The rationale for Mohs was explained to the patient and consent was obtained. The risks, benefits and alternatives to therapy were discussed in detail. Specifically, the risks of changes in hair growth pattern secondary to repair, infection, scarring, bleeding, prolonged wound healing, incomplete removal, allergy to anesthesia, nerve injury and recurrence were addressed. Prior to the procedure, the treatment site was clearly identified and confirmed by the patient. All components of Universal Protocol/PAUSE Rule completed.

## 2024-11-19 NOTE — ED ADULT TRIAGE NOTE - HISTORY OF COVID-19 VACCINATION
Yes [Negative] : Heme/Lymph [Chest Pain] : no chest pain [Palpitations] : no palpitations [Orthopnea] : no orthopnea [Cough] : no cough [Suicidal] : not suicidal [Insomnia] : no insomnia [Anxiety] : no anxiety [Depression] : no depression